# Patient Record
Sex: FEMALE | Race: WHITE | NOT HISPANIC OR LATINO | Employment: UNEMPLOYED | URBAN - METROPOLITAN AREA
[De-identification: names, ages, dates, MRNs, and addresses within clinical notes are randomized per-mention and may not be internally consistent; named-entity substitution may affect disease eponyms.]

---

## 2017-01-06 ENCOUNTER — GENERIC CONVERSION - ENCOUNTER (OUTPATIENT)
Dept: OTHER | Facility: OTHER | Age: 57
End: 2017-01-06

## 2017-01-06 ENCOUNTER — ALLSCRIPTS OFFICE VISIT (OUTPATIENT)
Dept: OTHER | Facility: OTHER | Age: 57
End: 2017-01-06

## 2017-01-11 ENCOUNTER — ALLSCRIPTS OFFICE VISIT (OUTPATIENT)
Dept: OTHER | Facility: OTHER | Age: 57
End: 2017-01-11

## 2017-01-11 ENCOUNTER — TRANSCRIBE ORDERS (OUTPATIENT)
Dept: ADMINISTRATIVE | Facility: HOSPITAL | Age: 57
End: 2017-01-11

## 2017-01-11 DIAGNOSIS — R94.31 ABNORMAL ELECTROCARDIOGRAM: ICD-10-CM

## 2017-01-11 DIAGNOSIS — R94.31 NONSPECIFIC ABNORMAL ELECTROCARDIOGRAM (ECG) (EKG): Primary | ICD-10-CM

## 2017-01-30 ENCOUNTER — HOSPITAL ENCOUNTER (OUTPATIENT)
Dept: NON INVASIVE DIAGNOSTICS | Facility: CLINIC | Age: 57
Discharge: HOME | End: 2017-01-30
Payer: COMMERCIAL

## 2017-01-30 DIAGNOSIS — R94.31 NONSPECIFIC ABNORMAL ELECTROCARDIOGRAM (ECG) (EKG): ICD-10-CM

## 2017-01-30 LAB
CHEST PAIN STATEMENT: NORMAL
MAX DIASTOLIC BP: 66 MMHG
MAX HEART RATE: 96 BPM
MAX PREDICTED HEART RATE: 163 BPM
MAX. SYSTOLIC BP: 114 MMHG
PROTOCOL NAME: NORMAL
REASON FOR TERMINATION: NORMAL
TARGET HR FORMULA: NORMAL
TEST INDICATION: NORMAL
TIME IN EXERCISE PHASE: 180 S

## 2017-01-30 PROCEDURE — 93017 CV STRESS TEST TRACING ONLY: CPT

## 2017-01-30 PROCEDURE — A9502 TC99M TETROFOSMIN: HCPCS

## 2017-01-30 PROCEDURE — 78452 HT MUSCLE IMAGE SPECT MULT: CPT

## 2017-01-30 RX ADMIN — REGADENOSON 0.4 MG: 0.08 INJECTION, SOLUTION INTRAVENOUS at 10:12

## 2017-02-02 ENCOUNTER — ALLSCRIPTS OFFICE VISIT (OUTPATIENT)
Dept: OTHER | Facility: OTHER | Age: 57
End: 2017-02-02

## 2017-04-13 ENCOUNTER — ALLSCRIPTS OFFICE VISIT (OUTPATIENT)
Dept: OTHER | Facility: OTHER | Age: 57
End: 2017-04-13

## 2017-05-01 ENCOUNTER — ALLSCRIPTS OFFICE VISIT (OUTPATIENT)
Dept: OTHER | Facility: OTHER | Age: 57
End: 2017-05-01

## 2017-06-12 ENCOUNTER — ALLSCRIPTS OFFICE VISIT (OUTPATIENT)
Dept: OTHER | Facility: OTHER | Age: 57
End: 2017-06-12

## 2017-06-12 DIAGNOSIS — I48.91 ATRIAL FIBRILLATION (HCC): ICD-10-CM

## 2017-06-12 DIAGNOSIS — I49.5 SICK SINUS SYNDROME (HCC): ICD-10-CM

## 2017-06-12 DIAGNOSIS — I10 ESSENTIAL (PRIMARY) HYPERTENSION: ICD-10-CM

## 2017-06-12 DIAGNOSIS — R00.1 BRADYCARDIA: ICD-10-CM

## 2017-06-12 DIAGNOSIS — R00.2 PALPITATIONS: ICD-10-CM

## 2017-06-12 DIAGNOSIS — E78.5 HYPERLIPIDEMIA: ICD-10-CM

## 2017-06-19 ENCOUNTER — TRANSCRIBE ORDERS (OUTPATIENT)
Dept: ADMINISTRATIVE | Facility: HOSPITAL | Age: 57
End: 2017-06-19

## 2017-06-19 DIAGNOSIS — R40.0 DAYTIME SLEEPINESS: ICD-10-CM

## 2017-06-19 DIAGNOSIS — R06.83 SNORING: Primary | ICD-10-CM

## 2017-06-19 DIAGNOSIS — I48.91 ATRIAL FIBRILLATION, UNSPECIFIED TYPE (HCC): ICD-10-CM

## 2017-07-13 ENCOUNTER — ALLSCRIPTS OFFICE VISIT (OUTPATIENT)
Dept: OTHER | Facility: OTHER | Age: 57
End: 2017-07-13

## 2017-07-13 ENCOUNTER — TRANSCRIBE ORDERS (OUTPATIENT)
Dept: SLEEP CENTER | Facility: CLINIC | Age: 57
End: 2017-07-13

## 2017-08-09 ENCOUNTER — ALLSCRIPTS OFFICE VISIT (OUTPATIENT)
Dept: OTHER | Facility: OTHER | Age: 57
End: 2017-08-09

## 2017-09-12 ENCOUNTER — TRANSCRIBE ORDERS (OUTPATIENT)
Dept: SLEEP CENTER | Facility: CLINIC | Age: 57
End: 2017-09-12

## 2017-09-12 DIAGNOSIS — G47.33 OSA (OBSTRUCTIVE SLEEP APNEA): Primary | ICD-10-CM

## 2017-09-13 ENCOUNTER — HOSPITAL ENCOUNTER (OUTPATIENT)
Dept: SLEEP CENTER | Facility: CLINIC | Age: 57
Discharge: HOME | End: 2017-09-13
Payer: COMMERCIAL

## 2017-09-13 DIAGNOSIS — G47.33 OSA (OBSTRUCTIVE SLEEP APNEA): ICD-10-CM

## 2017-09-13 PROCEDURE — 95810 POLYSOM 6/> YRS 4/> PARAM: CPT

## 2017-09-18 ENCOUNTER — TRANSCRIBE ORDERS (OUTPATIENT)
Dept: SLEEP CENTER | Facility: CLINIC | Age: 57
End: 2017-09-18

## 2017-09-18 DIAGNOSIS — G47.33 OSA (OBSTRUCTIVE SLEEP APNEA): Primary | ICD-10-CM

## 2017-10-25 ENCOUNTER — ALLSCRIPTS OFFICE VISIT (OUTPATIENT)
Dept: OTHER | Facility: OTHER | Age: 57
End: 2017-10-25

## 2018-01-10 NOTE — RESULT NOTES
Verified Results  HOLTER MONITOR - 24 HOUR 81Fkf2256 01:07PM Placido Gaston Order Number: [id number]     Test Name Result Flag Reference   HOLTER MONITOR - 24 HOUR (Report)     INDICATIONS: Palpitations     DESCRIPTION OF FINDINGS:   The patient was monitored for a total of 24 hours  The patient was predominantly noted to be in NSR throughout the study  The average heart rate was 49 beats per minute  The heart rate ranged from a low of 34 beats per minute in AM at 3:16 to a    maximum of 93 beats per minute in PM at 5:20  Ventricular ectopic activity consisted of 5 (0 0% of total beats)  There was no sustained or nonsustained ventricular tachycardia  Supraventricular ectopic activity consisted of 350 (0 5% of total beats)  There was one 4 beat run of supraventricular tachycardia identified  There was no evidence of atrial fibrillation or atrial flutter  There were no significant pauses  The longest R-R interval was 2 0 seconds  There was no evidence of advanced degree heart block  There were frequent episodes of sinus bradycardia  The accompanying patient diary notes symptoms of SOB and sharp pain which correlated with NSR  The patient also had flutter which correlated with NSR with a PVC  1  Predominantly NSR at a controlled rate with episodes of profound sinus bradycardia  2   Low density of PVC's  3   Low density of PAC's  4   Symptoms, as described, correlated with NSR with rare PVC's

## 2018-01-12 VITALS
HEIGHT: 65 IN | HEART RATE: 61 BPM | WEIGHT: 192 LBS | BODY MASS INDEX: 31.99 KG/M2 | SYSTOLIC BLOOD PRESSURE: 104 MMHG | DIASTOLIC BLOOD PRESSURE: 72 MMHG

## 2018-01-12 NOTE — PROGRESS NOTES
Chief Complaint  Patient is here for a EKG check per Dr Willis Bullard  Patient complaints of palpitations and dizziness  I reviewed the EKG with Dr Derek Byers  Active Problems    1  Atrial fibrillation (427 31) (I48 91)   2  Atypical chest pain (786 59) (R07 89)   3  Benign essential hypertension (401 1) (I10)   4  Chronic Lyme Disease (088 81)   5  Depression with anxiety (300 4) (F41 8)   6  Esophagitis, reflux (530 11) (K21 0)   7  Fatigue (780 79) (R53 83)   8  Glaucoma (365 9) (H40 9)   9  Hyperlipidemia (272 4) (E78 5)   10  Palpitations (785 1) (R00 2)   11  Sick sinus syndrome (427 81) (I49 5)   12  Sinus bradycardia (427 89) (R00 1)   13  Tachycardia-bradycardia syndrome (427 81) (I49 5)   14  Thoracic outlet syndrome (353 0) (G54 0)    Current Meds   1  Adult Aspirin EC Low Strength 81 MG Oral Tablet Delayed Release; TAKE 1 TABLET   DAILY; Therapy: (Recorded:17Oct2013) to Recorded   2  Atorvastatin Calcium 40 MG Oral Tablet; TAKE 1 TABLET AT BEDTIME; Therapy: 27Hgy7966 to (Evaluate:70Dwv7482)  Requested for: 27Apr2016; Last   Rx:22Apr2016 Ordered   3  Cosopt Ocumeter Plus 2-0 5 % SOLN;   Therapy: (Recorded:30Wfj5674) to Recorded   4  Dexilant 60 MG Oral Capsule Delayed Release; TAKE 1 CAPSULE DAILY EVERY   MORNING BEFORE BREAKFAST; Therapy: [phone]) to Recorded   5  Eliquis 5 MG Oral Tablet; Take 1 tablet twice daily  Requested for: 10Wdb6079; Last   Rx:36Ujy7990 Ordered   6  HydroCHLOROthiazide 25 MG Oral Tablet; TAKE 1 TABLET DAILY; Therapy: (Recorded:17Oct2013) to Recorded   7  Losartan Potassium 25 MG Oral Tablet; TAKE 1 TAB QHS; Therapy: 07LAB6247 to (Tika Oden)  Requested for: 39NIH0735; Last   Rx:34Vuv8880 Ordered   8  Lumigan 0 01 % Ophthalmic Solution; INSTILL 1 DROP INTO BOTH EYES ONCE DAILY   IN THE EVENING; Therapy: (Recorded:17Oct2013) to Recorded   9  RaNITidine HCl - 150 MG Oral Tablet; TAKE 1 TABLET DAILY; Therapy: (Recorded:19Apr2016) to Recorded   10  Restasis 0 05 % Ophthalmic Emulsion; INSTILL 1 DROP IN [facility] EYE TWICE DAILY; Therapy: (Recorded:46Bho3780) to Recorded   11  Sotalol HCl - 80 MG Oral Tablet; TAKE 1 TABLET TWICE DAILY; Therapy: (Kory Karimi) to Recorded   12  TraZODone HCl - 150 MG Oral Tablet; TAKE 1 TABLET AT BEDTIME; Therapy: (Recorded:16Cwg5506) to Recorded   13  Xanax 0 5 MG Oral Tablet; TAKE 1 TABLET 3 TIMES DAILY AS NEEDED; Therapy: (Recorded:63Utt0343) to Recorded   14  Zoloft 100 MG Oral Tablet; TAKE 1 TABLET DAILY; Therapy: (Recorded:20Zlx6088) to Recorded    Allergies    1  Lomotil TABS   2  Percocet TABS    Vitals  Signs    Heart Rate: 61  Systolic: 311  Diastolic: 72  Height: 5 ft 5 in  Weight: 192 lb   BMI Calculated: 31 95  BSA Calculated: 1 94    Plan  Palpitations    · EKG/ECG- POC; Status:Complete;   Done: 86OPC0321    Discussion/Summary    EKG reviewed  T wave inversion in lateral leads    this is new     Repeat another EKG in a week   follow up with Dr Pamela Lacey in a week if EKG changes are persisting or new symptoms of chest discomfort  Future Appointments    Date/Time Provider Specialty Site   01/11/2017 02:00 PM Cardiology, [address]   02/01/2017 11:40 AM TARA Zhang  Cardiology [facility]   01/19/2017 11:20 AM TARA Oliveira   Cardiology [facility]     Signatures   Electronically signed by : Asa Closs, ; Jan 6 2017 11:47AM EST                       (Author)    Electronically signed by : TARA Garcia ; Jan 8 2017  9:21AM EST                       (Author)

## 2018-01-13 VITALS
SYSTOLIC BLOOD PRESSURE: 140 MMHG | DIASTOLIC BLOOD PRESSURE: 88 MMHG | BODY MASS INDEX: 33.32 KG/M2 | HEIGHT: 65 IN | HEART RATE: 67 BPM | WEIGHT: 200 LBS

## 2018-01-13 VITALS
WEIGHT: 199.25 LBS | DIASTOLIC BLOOD PRESSURE: 84 MMHG | HEART RATE: 72 BPM | BODY MASS INDEX: 33.2 KG/M2 | SYSTOLIC BLOOD PRESSURE: 116 MMHG | HEIGHT: 65 IN

## 2018-01-13 NOTE — PROGRESS NOTES
Chief Complaint  Samantha Wall said she was at the hospital visiting her sister (inpatient) when she started to feel lightheaded  She felt like her heart was beating in her throat and she was SOB  She drove to the cardiology office and asked for an appt  Jennifer Reid approved nurse visit/EKG  Ordered by Dr Jose Hussein, reviewed by dr Vincent Eid said the EKG was okay  No changes to medication  Samantha Wall made a f/u appt with dr Jose Hussein  Active Problems    1  Abnormal EKG (794 31) (R94 31)   2  Atrial fibrillation (427 31) (I48 91)   3  Atypical chest pain (786 59) (R07 89)   4  Benign essential hypertension (401 1) (I10)   5  Chronic Lyme Disease (088 81)   6  Depression with anxiety (300 4) (F41 8)   7  Esophagitis, reflux (530 11) (K21 0)   8  Fatigue (780 79) (R53 83)   9  Glaucoma (365 9) (H40 9)   10  Hyperlipidemia (272 4) (E78 5)   11  Palpitations (785 1) (R00 2)   12  Sick sinus syndrome (427 81) (I49 5)   13  Sinus bradycardia (427 89) (R00 1)   14  T wave inversion in EKG (794 31) (R94 31)   15  Tachycardia-bradycardia syndrome (427 81) (I49 5)   16  Thoracic outlet syndrome (353 0) (G54 0)    Current Meds   1  Adult Aspirin EC Low Strength 81 MG Oral Tablet Delayed Release; TAKE 1 TABLET   DAILY; Therapy: (Recorded:17Oct2013) to Recorded   2  Alphagan P 0 1 % Ophthalmic Solution; INSTILL 1 DROP IN BOTH EYES EVERY 12   HOURS DAILY; Therapy: 48PFC4420 to Recorded   3  Atorvastatin Calcium 40 MG Oral Tablet; TAKE 1 TABLET AT BEDTIME; Therapy: 22Apr2016 to (Evaluate:08Apr2018)  Requested for: 14Apr2017; Last   Rx:13Apr2017 Ordered   4  Cosopt Ocumeter Plus 2-0 5 % SOLN;   Therapy: (Recorded:78Kce2690) to Recorded   5  Dexilant 60 MG Oral Capsule Delayed Release; TAKE 1 CAPSULE DAILY EVERY   MORNING BEFORE BREAKFAST; Therapy: [phone]) to Recorded   6  Eliquis 5 MG Oral Tablet; Take 1 tablet twice daily  Requested for: 40Mua6971; Last   Rx:68Oxz6365 Ordered   7   HydroCHLOROthiazide 25 MG Oral Tablet; TAKE 1 TABLET DAILY; Therapy: (Recorded:45Eft9421) to Recorded   8  Losartan Potassium 25 MG Oral Tablet; TAKE 1 TAB QHS; Therapy: 86ZIG1025 to (Albertina Frazier)  Requested for: 39SCD9922; Last   Rx:77Wqx0731 Ordered   9  Lumigan 0 01 % Ophthalmic Solution; INSTILL 1 DROP INTO BOTH EYES ONCE DAILY   IN THE EVENING; Therapy: (Recorded:32Rez4617) to Recorded   10  RaNITidine HCl - 150 MG Oral Tablet; TAKE 1 TABLET DAILY; Therapy: (Recorded:47Azo5095) to Recorded   11  Restasis 0 05 % Ophthalmic Emulsion; INSTILL 1 DROP IN [facility] EYE TWICE DAILY; Therapy: (Recorded:70Taa8549) to Recorded   12  Sotalol HCl - 80 MG Oral Tablet; TAKE 1 TABLET TWICE DAILY  Requested for:    39OLY0723; Last Rx:07Mar2017 Ordered   13  TraZODone HCl - 150 MG Oral Tablet; TAKE 1 TABLET AT BEDTIME; Therapy: (Recorded:03Ghj8124) to Recorded   14  Xanax 0 5 MG Oral Tablet; TAKE 1 TABLET 3 TIMES DAILY AS NEEDED; Therapy: (Recorded:99Htw8699) to Recorded   15  Zoloft 100 MG Oral Tablet; TAKE 1 TABLET DAILY; Therapy: (Recorded:76Pdq6233) to Recorded    Allergies    1  Lomotil TABS   2  Percocet TABS    Vitals  Signs    Heart Rate: 67, Apical  Systolic: 000, RUE  Diastolic: 88, RUE  Height: 5 ft 5 in  Weight: 200 lb   BMI Calculated: 33 28  BSA Calculated: 1 98    Plan  Atrial fibrillation, Palpitations    · EKG/ECG- POC; Status:Complete;   Done: 66IEU8388    Future Appointments    Date/Time Provider Specialty Site   04/13/2018 10:30 AM Cardiology, [address]   07/13/2017 08:00 AM Cardiology, Device Remote  [address] [place] [address] Av   06/12/2017 11:20 AM TARA Marks  Cardiology [facility]     Signatures   Electronically signed by : Bo León, ; May  2 2017  9:31AM EST                       (Author)    Electronically signed by :  Meryle Peacemaker, M D ; May  2 2017  6:46PM EST                       (Author)

## 2018-01-14 VITALS
HEIGHT: 65 IN | BODY MASS INDEX: 32.72 KG/M2 | SYSTOLIC BLOOD PRESSURE: 128 MMHG | DIASTOLIC BLOOD PRESSURE: 86 MMHG | HEART RATE: 83 BPM | WEIGHT: 196.38 LBS

## 2018-01-14 VITALS
WEIGHT: 196 LBS | BODY MASS INDEX: 32.65 KG/M2 | DIASTOLIC BLOOD PRESSURE: 90 MMHG | HEART RATE: 61 BPM | SYSTOLIC BLOOD PRESSURE: 132 MMHG | HEIGHT: 65 IN

## 2018-01-14 VITALS
DIASTOLIC BLOOD PRESSURE: 74 MMHG | HEIGHT: 65 IN | SYSTOLIC BLOOD PRESSURE: 110 MMHG | HEART RATE: 60 BPM | BODY MASS INDEX: 31.99 KG/M2 | WEIGHT: 192 LBS

## 2018-01-14 NOTE — PROGRESS NOTES
Chief Complaint  Patient here for EKG per Dr Jennyfer Collins to review afib and new T wave inversion on EKG 12/20/16  Patient HR 60 and regular  /72  Does c/o neck discomfort and headaches since PM implanted, may be related to change in sleep position  No cardiac symptoms today  Dr Jennyfer Collins reviewed EKG and recommended pharmacologic nuclear stress test (patient states cannot walk on a treadmill) to r/u CAD  Scheduled for 1/16/17  Has f/u appt with Dr Jennyfer Collins 1/19/17  Active Problems    1  Abnormal EKG (794 31) (R94 31)   2  Atrial fibrillation (427 31) (I48 91)   3  Atypical chest pain (786 59) (R07 89)   4  Benign essential hypertension (401 1) (I10)   5  Chronic Lyme Disease (088 81)   6  Depression with anxiety (300 4) (F41 8)   7  Esophagitis, reflux (530 11) (K21 0)   8  Fatigue (780 79) (R53 83)   9  Glaucoma (365 9) (H40 9)   10  Hyperlipidemia (272 4) (E78 5)   11  Palpitations (785 1) (R00 2)   12  Sick sinus syndrome (427 81) (I49 5)   13  Sinus bradycardia (427 89) (R00 1)   14  T wave inversion in EKG (794 31) (R94 31)   15  Tachycardia-bradycardia syndrome (427 81) (I49 5)   16  Thoracic outlet syndrome (353 0) (G54 0)    Current Meds   1  Adult Aspirin EC Low Strength 81 MG Oral Tablet Delayed Release; TAKE 1 TABLET   DAILY; Therapy: (Recorded:17Oct2013) to Recorded   2  Atorvastatin Calcium 40 MG Oral Tablet; TAKE 1 TABLET AT BEDTIME; Therapy: 13Bru8189 to (Evaluate:10Gsk8255)  Requested for: 27Apr2016; Last   Rx:22Apr2016 Ordered   3  Cosopt Ocumeter Plus 2-0 5 % SOLN;   Therapy: (Recorded:03Nov2016) to Recorded   4  Dexilant 60 MG Oral Capsule Delayed Release; TAKE 1 CAPSULE DAILY EVERY   MORNING BEFORE BREAKFAST; Therapy: (29-29-69-33) to Recorded   5  Eliquis 5 MG Oral Tablet; Take 1 tablet twice daily  Requested for: 38Luf0414; Last   Rx:94Osm9447 Ordered   6  HydroCHLOROthiazide 25 MG Oral Tablet; TAKE 1 TABLET DAILY; Therapy: (Recorded:17Oct2013) to Recorded   7   Losartan Potassium 25 MG Oral Tablet; TAKE 1 TAB QHS; Therapy: 87RUD5236 to (Abhay Hines)  Requested for: 59DDI7411; Last   Rx:73Dfv0139 Ordered   8  Lumigan 0 01 % Ophthalmic Solution; INSTILL 1 DROP INTO BOTH EYES ONCE DAILY   IN THE EVENING; Therapy: (Recorded:70Jsj0975) to Recorded   9  RaNITidine HCl - 150 MG Oral Tablet; TAKE 1 TABLET DAILY; Therapy: (Recorded:45Qfq9919) to Recorded   10  Restasis 0 05 % Ophthalmic Emulsion; INSTILL 1 DROP IN [facility] EYE TWICE DAILY; Therapy: (Recorded:56Tnr3413) to Recorded   11  Sotalol HCl - 80 MG Oral Tablet; TAKE 1 TABLET TWICE DAILY; Therapy: (77 [phone]) to Recorded   12  TraZODone HCl - 150 MG Oral Tablet; TAKE 1 TABLET AT BEDTIME; Therapy: (Recorded:98Ygl9091) to Recorded   13  Xanax 0 5 MG Oral Tablet; TAKE 1 TABLET 3 TIMES DAILY AS NEEDED; Therapy: (Recorded:74Vjs6220) to Recorded   14  Zoloft 100 MG Oral Tablet; TAKE 1 TABLET DAILY; Therapy: (Recorded:28Mmk8607) to Recorded    Allergies    1  Lomotil TABS   2  Percocet TABS    Vitals  Signs    Heart Rate: 60  Systolic: 752, LUE, Sitting  Diastolic: 74, LUE, Sitting  BP Cuff Size: Large  Height: 5 ft 5 in  Weight: 192 lb   BMI Calculated: 31 95  BSA Calculated: 1 94    Plan   Atrial fibrillation, T wave inversion in EKG    · EKG/ECG- POC; Status:Complete;   Done: 10VZF2104    NM MYOCARDIAL PERFUSION SPECT (RX STRESS AND/OR REST); Status:Need Information - Financial Authorization,Retrospective By Protocol Authorization; Requested ISC:[id number];   Perform:[facility]; XIS:[id number]; Last Updated By:Vinny Knapp; 1/14/2017 7:48:42 PM;Ordered; For:Abnormal EKG, T wave inversion in EKG; Ordered By:Norman San; Discussion/Summary    Wait till 4 weeks after device for nuclear study  then follow up with me        Future Appointments    Date/Time Provider Specialty Site   04/13/2017 10:30 AM Cardiology, [address] [facility]   07/13/2017 08:00 AM Cardiology, Device Remote   [address]   02/01/2017 11:40 AM ATRA Doll  Cardiology [facility] CARDIOLOGY [place]   02/02/2017 09:00 AM TARA Mccollum   Cardiology  CARDIOLOGY  [facility]     Signatures   Electronically signed by : TARA Cardona ; Mathew 15 2017  4:38PM EST                       (Author)

## 2018-01-16 NOTE — PROCEDURES
Procedures by Xiang Pearson MD at 12/28/2016   3:17 PM      Author:  Xiang Pearson MD Service:  Electrophysiology Author Type:  Physician     Filed:  12/28/2016  4:59 PM Date of Service:  12/28/2016  3:17 PM Status:  Signed     :  Xiang Pearson MD (Physician)            PP DUAL  CHAMBER    History and physical were reviewed  Patient was examined and history was reviewed  No change in patient's condition Since history and physical has been completed        The pre- operative diagnosis:  Sick sinus syndrome  Tachy jesus syndrome  PAF        Postoperative diagnosis:  Sick sinus syndrome  Tachy jesus syndrome  PAF      Procedure:  Dual chamber PPM        Surgeon: [address] [place] [address] Drive -none    Specimens - none    Estimated blood loss- 20 ml    Findings-none    Complications none    Anesthesia-  Anesthesia by anaesthesiologist , local lidocaine by myself      Details of the device            Description of procedure: The patient was seen before the procedure  The details of the procedure was explained and patient agreed to the same  Appropriate consent was signed  The patient was brought to the electrophysiologic laboratory  Proper time out was done  Sterile dressing and draping was done  Local lidocaine was infiltrated, In the infraclavicular region at the site of device implant  Initial incision was made by a sharp number 10 blade  Thereafter electrocautery and blunt dissection was used to form a prepectoral pocket  Appropriate hemostasis was taken care of  There was an area on the floor, medial end, where significant bleeding  happened and required electro-cautery for control  20 mL of radiocontrast, followed by 20 mL of saline, was injected in a peripheral vein on the side of the implant  Under direct visualization, the axillary vein was  Punctured,extra-thoracic  The patient has very small caliber veins  Initial access by  micro-puncture needle and 0 014 wire   Then upgraded to standard wire A single guidewire was inserted, and taking all the way to the inferior vena cava to confirm that it is on the right side  We also confirmed by the left anterior oblique view that the wire is in the right side  Thereafter a second access was obtained  using the fluoroscopic image of the venogram as a roadmap  Wire was once again taken to the inferior vena cava, and position checked in Swedish views To confirm the appropriate position  A sheath was passed over the wire  The right ventricular lead was taken through the sheath  In BRICENO view the lead was taken to the right ventricular outflow tract  It was then dropped to the apex  Position of the lead was confirmed in both BRICENO and Swedish  views that it was apical and septal  Appropriate sensing and thresholds were noted  The lead was screwed in  Once again the lead was tested for appropriate sensing, impedance and threshold  The sheath was removed  The lead was sutured to the prepectoral  fascia and muscle  A 7 Welsh sheath was passed over the second wire  The dilator and wire were removed  An atrial lead with the car stylet in it was taken through this sheath into the right atrium  It was maneuvered into the right atrial appendage  Appropriate sensing  and thresholds were noted  The lead was screwed in  The sheath was removed  The lead was sutured to the pectoral muscle and fascia    The pocket was washed with large amounts of antibiotic saline  Appropriate hemostasis was taken care of  The lead was connected to the generator  The generator and leads were placed inside the pocket  Generator was tied down to the pocket  Thereafter  the device was interrogated and proper sensing and thresholds through the device were confirmed  The pocket was closed in 3 separate layers with intermittent sutures   Dressing was done with Steri-Strips, Telfa and Tegaderm    Because of oozing from a site on floor of pocket,  which required extensive electro-cautery , a modified pressure dressing was applied         Summary of the procedure: The patient came in to the laboratory for dual -chamber device placement  The device has been placed and patient tolerated the procedure well                           Christy MADERA    Dec 28 2016  5:00PM [facility] Standard Time

## 2018-01-17 NOTE — RESULT NOTES
Verified Results  HOLTER MONITOR - 24 HOUR [id number] 09:19AM Loyd Metzger Order Number: [id number]    - Patient Instructions: To schedule this appointment, please contact Central Scheduling at [phone]  For Lakeisha Grande, please call [phone]   Order Number: [id number]    - Patient Instructions: To schedule this appointment, please contact Central Scheduling at [phone]  For Lakeisha Grande, please call [phone]  Test Name Result Flag Reference   HOLTER MONITOR - 24 HOUR (Report)     INDICATIONS: Bradycardia     DESCRIPTION OF FINDINGS:   The patient was monitored for a total of 24 hours  The patient was predominantly noted to be in NSR throughout the study  The average heart rate was 48 beats per minute  The heart rate ranged from a low of 33 beats per minute in AM at 4:52 to a    maximum of 90 beats per minute in PM at 8:00  Ventricular ectopic activity consisted of 0 (0 0% of total beats)  There was no sustained or nonsustained ventricular tachycardia  Supraventricular ectopic activity consisted of 767 (1 1% of total beats)  There was no supraventricular tachycardia identified  There was no evidence of atrial fibrillation or atrial flutter  There were no significant pauses  There were frequent episodes of sinus bradycardia (SB)  The longest R-R interval was 2 2 seconds  There was no evidence of advanced degree heart block  The accompanying patient diary notes symptoms of pain down arm and flutter  Correlation with the tracings at these times reveals sinus bradycardia  1  Predominantly NSR with a low average HR and frequent episodes of SB noted, most notably nocturnally  2  No PVC's  3   Moderate density of PAC's          4  Symptoms as described correlated with SB

## 2018-02-02 ENCOUNTER — OFFICE VISIT (OUTPATIENT)
Dept: CARDIOLOGY CLINIC | Facility: CLINIC | Age: 58
End: 2018-02-02
Payer: COMMERCIAL

## 2018-02-02 VITALS
WEIGHT: 203 LBS | DIASTOLIC BLOOD PRESSURE: 84 MMHG | BODY MASS INDEX: 34.66 KG/M2 | HEART RATE: 65 BPM | HEIGHT: 64 IN | SYSTOLIC BLOOD PRESSURE: 122 MMHG

## 2018-02-02 DIAGNOSIS — I48.0 PAROXYSMAL ATRIAL FIBRILLATION (HCC): ICD-10-CM

## 2018-02-02 DIAGNOSIS — I10 HYPERTENSION, UNSPECIFIED TYPE: ICD-10-CM

## 2018-02-02 DIAGNOSIS — Z95.0 HISTORY OF PERMANENT CARDIAC PACEMAKER PLACEMENT: ICD-10-CM

## 2018-02-02 DIAGNOSIS — I48.91 ATRIAL FIBRILLATION, UNSPECIFIED TYPE (HCC): Primary | ICD-10-CM

## 2018-02-02 DIAGNOSIS — E78.5 DYSLIPIDEMIA: ICD-10-CM

## 2018-02-02 PROCEDURE — 99214 OFFICE O/P EST MOD 30 MIN: CPT | Performed by: INTERNAL MEDICINE

## 2018-02-02 PROCEDURE — 93000 ELECTROCARDIOGRAM COMPLETE: CPT | Performed by: INTERNAL MEDICINE

## 2018-02-02 RX ORDER — TRAMADOL HYDROCHLORIDE 100 MG/1
100 TABLET, EXTENDED RELEASE ORAL DAILY
COMMUNITY
Start: 2018-01-30 | End: 2021-11-23 | Stop reason: ALTCHOICE

## 2018-02-02 RX ORDER — SOTALOL HYDROCHLORIDE 80 MG/1
1 TABLET ORAL 2 TIMES DAILY
COMMUNITY
End: 2018-02-22 | Stop reason: SDUPTHER

## 2018-02-02 RX ORDER — HYDROCODONE BITARTRATE AND ACETAMINOPHEN 5; 325 MG/1; MG/1
1 TABLET ORAL 2 TIMES DAILY PRN
Refills: 0 | COMMUNITY
Start: 2018-01-26 | End: 2021-11-23 | Stop reason: ALTCHOICE

## 2018-02-02 RX ORDER — TIZANIDINE 2 MG/1
1 TABLET ORAL 3 TIMES DAILY
COMMUNITY
End: 2019-09-10 | Stop reason: SDUPTHER

## 2018-02-02 RX ORDER — GABAPENTIN 400 MG/1
CAPSULE ORAL
COMMUNITY
Start: 2017-11-16 | End: 2018-11-27 | Stop reason: ALTCHOICE

## 2018-02-07 ENCOUNTER — CLINICAL SUPPORT (OUTPATIENT)
Dept: CARDIOLOGY CLINIC | Facility: CLINIC | Age: 58
End: 2018-02-07
Payer: COMMERCIAL

## 2018-02-07 DIAGNOSIS — I49.5 SINOATRIAL NODE DYSFUNCTION (HCC): Primary | ICD-10-CM

## 2018-02-07 DIAGNOSIS — Z95.0 CARDIAC PACEMAKER IN SITU: ICD-10-CM

## 2018-02-07 PROCEDURE — 93296 REM INTERROG EVL PM/IDS: CPT | Performed by: INTERNAL MEDICINE

## 2018-02-07 PROCEDURE — 93294 REM INTERROG EVL PM/LDLS PM: CPT | Performed by: INTERNAL MEDICINE

## 2018-02-07 NOTE — PROGRESS NOTES
CARELINK TRANSMISSION: BATTERY STATUS "OK"  AP 98 5%  0%  ALL AVAILABLE LEAD PARAMETERS WITHIN NORMAL LIMITS  1 VHR NOTED, 1 SEC LONG  AVAIL EGRAM PRESENTS AS SVT  NORMAL DEVICE FUNCTION   NC       Current Outpatient Prescriptions:     ALPRAZolam (XANAX) 0 5 mg tablet, Take 0 5 mg by mouth 3 (three) times a day as needed for anxiety, Disp: , Rfl:     apixaban (ELIQUIS) 5 mg, Take 1 tablet by mouth 2 (two) times a day, Disp: , Rfl:     atorvastatin (LIPITOR) 40 mg tablet, Take 40 mg by mouth daily, Disp: , Rfl:     bimatoprost (LUMIGAN) 0 01 % ophthalmic drops, Apply 1 drop to eye Daily, Disp: , Rfl:     cycloSPORINE (RESTASIS) 0 05 % ophthalmic emulsion, Administer 1 drop to both eyes every 12 (twelve) hours, Disp: , Rfl:     dexlansoprazole (DEXILANT) 60 MG capsule, Take 60 mg by mouth daily, Disp: , Rfl:     dorzolamide-timolol (COSOPT) 2-0 5 % ophthalmic solution, Administer 1 drop to both eyes 2 (two) times a day, Disp: , Rfl:     gabapentin (NEURONTIN) 400 mg capsule, , Disp: , Rfl:     hydrochlorothiazide (HYDRODIURIL) 25 mg tablet, Take 25 mg by mouth daily, Disp: , Rfl:     HYDROcodone-acetaminophen (NORCO) 5-325 mg per tablet, Take 1 tablet by mouth 2 (two) times a day as needed for pain, Disp: , Rfl: 0    LIDOPRO 4-4-5 % PTCH, APPLY 1 PATCH TOPICALLY TO THE AFFECTED AREA EVERY 8 TO 12 HOURS AS NEEDED MAX 2 PATCHES PER DAY, Disp: , Rfl: 6    olopatadine HCl (PATADAY) 0 2 % opth drops, Administer 1 drop to both eyes 2 (two) times a day, Disp: , Rfl:     polyethylene glycol-propylene glycol (SYSTANE) 0 4-0 3 %, every 3 (three) hours as needed, Disp: , Rfl:     ranitidine (ZANTAC) 150 mg tablet, Take 150 mg by mouth daily at bedtime, Disp: , Rfl:     sertraline (ZOLOFT) 100 mg tablet, Take 100 mg by mouth daily, Disp: , Rfl:     sotalol (BETAPACE) 80 mg tablet, Take 1 tablet by mouth 2 (two) times a day, Disp: , Rfl:     tiZANidine (ZANAFLEX) 2 mg tablet, Take 1 tablet by mouth 3 (three) times a day, Disp: , Rfl:     traMADol (ULTRAM-ER) 100 mg 24 hr tablet, , Disp: , Rfl:     traZODone (DESYREL) 150 mg tablet, Take 150 mg by mouth daily at bedtime, Disp: , Rfl:     VENTOLIN  (90 Base) MCG/ACT inhaler, , Disp: , Rfl:     [address]  [facility], [address] [facility]  [phone]     Transthoracic Echocardiogram  2D, M-mode, Doppler, and Color Doppler     Study date:  29-Nov-2016  LEFT VENTRICLE:  Size was normal   Systolic function was normal by EF (single plane method of disks)  Ejection  fraction was estimated to be 60 %

## 2018-02-22 DIAGNOSIS — I48.0 PAROXYSMAL ATRIAL FIBRILLATION (HCC): Primary | ICD-10-CM

## 2018-02-22 RX ORDER — SOTALOL HYDROCHLORIDE 80 MG/1
TABLET ORAL
Qty: 180 TABLET | Refills: 3 | Status: SHIPPED | OUTPATIENT
Start: 2018-02-22 | End: 2019-01-14 | Stop reason: SDUPTHER

## 2018-03-07 NOTE — PROGRESS NOTES
"  Discussion/Summary  Normal device function     Brief PAT  Results/Data  Cardiac Device Remote [id number] 01:07PM Urvashi Peraza     Test Name Result Flag Reference   MISCELLANEOUS COMMENT      CARELINK TRANSMISSION: BATTERY VOLTAGE ADEQUATE (9 5 YRS)  AP-99%, <0 1%  ALL AVAILABLE LEAD PARAMETERS WITHIN NORMAL LIMITS  1 VHR EPISODE- PAT ON EGM  PT ON ELIQUIS, ASA 81MG & SOTALOL  NORMAL DEVICE FUNCTION  GV   Cardiac Electrophysiology Report      [id number]  pdf   DEVICE TYPE Pacemaker       Cardiac Electrophysiology Report [id number] 01:07PM Urvashi Peraza     Test Name Result Flag Reference   Cardiac Electrophysiology Report      [id number]  pdf     Signatures   Electronically signed by : Liz Torrez RN; Jul 17 2017  3:40PM EST                       (Author)    Electronically signed by : TARA Garcia ; Jul 17 2017 10:58PM EST                       (Author)    "

## 2018-03-07 NOTE — PROGRESS NOTES
"  Discussion/Summary  Normal device function      Results/Data  Cardiac Device Remote 25Oct2017 01:46PM Avis Ege     Test Name Result Flag Reference   MISCELLANEOUS COMMENT (Report)     CARELINK TRANSMISSION:V2LLEPFQQW VOLTAGE ADEQUATE (9 YRS)  AP-98 5%, -0 04%  ALL AVAILABLE LEAD PARAMETERS APPEAR WITHIN NORMAL LIMITS & STABLE  1 VHR EPISODE WITH EGRM SHOWING PAT @ 167 BPM, 14 BEATS  HX OF SAME  PT TAKES ASA 81, ELIQUIS, SOTALOL  PACEMAKER FUNCTIONING APPROPRIATELY  eb   Cardiac Electrophysiology Report      [id number]  pdf   DEVICE TYPE Pacemaker       Cardiac Electrophysiology Report [id number] 01:46PM Avis Ege     Test Name Result Flag Reference   Cardiac Electrophysiology Report      [id number]     Signatures   Electronically signed by : Elsa Champion, ; Oct 26 2017 12:52PM EST                       (Author)    Electronically signed by : Arlyn Muñiz DO; Oct 29 2017  7:10PM EST                       (Author)    "

## 2018-03-07 NOTE — PROGRESS NOTES
"  Discussion/Summary  Normal device function     Atrial pacing    PAF - history of same  on sotalol  on eliquis  Results/Data  Cardiac Device In Clinic [id number] 03:18PM Sari Leyden     Test Name Result Flag Reference   MISCELLANEOUS COMMENT (Report)     DEVICE INTERROGATED IN THE [facility] [employer] OFFICE  BATTERY VOLTAGE ADEQUATE (9 5 YRS)  AP-99%, <0 1%  ALL LEAD PARAMETERS WITHIN NORMAL LIMITS  ALL OTHER TESTING WITHIN NORMAL LIMITS  DECREASE MADE TO RA & RV AMPLITUDE TO PROMOTE DEVICE LONGEVITY WHILE MAINTAINING AN APPROPRIATE SAFETY MARGIN  2 VHR EPISODES- 6 BEATS PAT ON 4/6/17 & 6 BEATS NSVT ON 2/19/17, AVG CL~380MS  EF-60% (ECHO 11/29/16)  PT ON ASA 81MG, ELIQUIS & SOTALOL  NORMAL DEVICE FUNCTION  GV   Cardiac Electrophysiology Report      [id number]  pdf   DEVICE TYPE Pacemaker       Cardiac Electrophysiology Report [id number] 03:18PM Sari Leyden     Test Name Result Flag Reference   Cardiac Electrophysiology Report      [id number]  pdf     Signatures   Electronically signed by : Joey Horton RN; Apr 13 2017 12:43PM EST                       (Author)    Electronically signed by : TARA Gonzalez ; Apr 16 2017 12:34PM EST                       (Author)    "

## 2018-03-07 NOTE — PROGRESS NOTES
"  Discussion/Summary  Normal device function      Results/Data  Cardiac Device In Clinic [id number] 06:59PM [facility]     Test Name Result Flag Reference   MISCELLANEOUS COMMENT (Report)     DEVICE INTERROGATED IN THE [facility] OFFICE  BATTERY VOLTAGE ADEQUATE (11 YRS)  AP-99%, <0 1%  NO SIGNIFICANT HIGH RATE EPISODES  ALL LEAD PARAMETERS WITHIN NORMAL LIMITS  ALL OTHER TESTING WITHIN NORMAL LIMITS  NORMAL DEVICE FUNCTION  WOUND CHECK: INCISION CLEAN AND DRY WITH EDGES APPROXIMATE; WOUND CARE AND RESTRICTIONS REVIEWED WITH PATIENT  GV   Cardiac Electrophysiology Report      [id number] [id number]  pdf   DEVICE TYPE Pacemaker       Cardiac Electrophysiology Report [id number] 06:59PM [facility]     Test Name Result Flag Reference   Cardiac Electrophysiology Report      [id number]  pdf     Signatures   Electronically signed by : Lisa Garcia RN; Jan 11 2017  2:27PM EST                       (Author)    Electronically signed by : TARA Edmonds ; Jan 14 2017  9:15PM EST                       (Author)    "

## 2018-03-30 ENCOUNTER — TELEPHONE (OUTPATIENT)
Dept: CARDIOLOGY CLINIC | Facility: CLINIC | Age: 58
End: 2018-03-30

## 2018-03-30 NOTE — TELEPHONE ENCOUNTER
FYI: Ana reporting "feeling strange" felt a vibration feeling in her chest, then developed a sharp headache  W/c/o fatigue  BP's 101/56, 107/64, 61  Advised to report to ER or call the office back if symptoms become worse  Pt sending over a transmission now     S/W Device no episodes

## 2018-04-02 DIAGNOSIS — E78.5 DYSLIPIDEMIA: Primary | ICD-10-CM

## 2018-04-03 RX ORDER — ATORVASTATIN CALCIUM 40 MG/1
TABLET, FILM COATED ORAL
Qty: 90 TABLET | Refills: 3 | Status: SHIPPED | OUTPATIENT
Start: 2018-04-03 | End: 2019-03-28 | Stop reason: SDUPTHER

## 2018-04-13 ENCOUNTER — CLINICAL SUPPORT (OUTPATIENT)
Dept: CARDIOLOGY CLINIC | Facility: CLINIC | Age: 58
End: 2018-04-13
Payer: COMMERCIAL

## 2018-04-13 DIAGNOSIS — Z95.0 PRESENCE OF PERMANENT CARDIAC PACEMAKER: ICD-10-CM

## 2018-04-13 DIAGNOSIS — I49.5 SICK SINUS SYNDROME (HCC): Primary | ICD-10-CM

## 2018-04-13 PROCEDURE — 93294 REM INTERROG EVL PM/LDLS PM: CPT | Performed by: INTERNAL MEDICINE

## 2018-04-13 PROCEDURE — 93296 REM INTERROG EVL PM/IDS: CPT | Performed by: INTERNAL MEDICINE

## 2018-04-13 NOTE — PROGRESS NOTES
DEVICE INTERROGATED IN THE [facility] OFFICE  (PACEMAKER) BATTERY VOLTAGE ADEQUATE  (8 5 YRS)  AP 99%  <1%  ALL LEAD PARAMETERS WITHIN NORMAL LIMITS  NO SIGNIFICANT HIGH RATE EPISODES   NORMAL DEVICE FUNCTION --HERNÁNDEZ

## 2018-07-12 ENCOUNTER — REMOTE DEVICE CLINIC VISIT (OUTPATIENT)
Dept: CARDIOLOGY CLINIC | Facility: CLINIC | Age: 58
End: 2018-07-12
Payer: COMMERCIAL

## 2018-07-12 DIAGNOSIS — I49.5 SSS (SICK SINUS SYNDROME) (HCC): Primary | ICD-10-CM

## 2018-07-12 DIAGNOSIS — Z95.0 PRESENCE OF CARDIAC PACEMAKER: ICD-10-CM

## 2018-07-12 PROCEDURE — 93296 REM INTERROG EVL PM/IDS: CPT | Performed by: INTERNAL MEDICINE

## 2018-07-12 PROCEDURE — 93294 REM INTERROG EVL PM/LDLS PM: CPT | Performed by: INTERNAL MEDICINE

## 2018-07-12 NOTE — PROGRESS NOTES
Results for orders placed or performed in visit on 07/12/18   Cardiac EP device report    Narrative    MDT DUAL PM  CARELINK TRANSMISSION: BATTERY VOLTAGE ADEQUATE (8 5 YRS)  AP: 98 2%  : <0 1%  ALL AVAILABLE LEAD PARAMETERS WITHIN NORMAL LIMITS  NO SIGNIFICANT HIGH RATE EPISODES  PACEMAKER FUNCTIONING APPROPRIATELY   [address] [place] [address]

## 2018-07-25 ENCOUNTER — TELEPHONE (OUTPATIENT)
Dept: CARDIOLOGY CLINIC | Facility: CLINIC | Age: 58
End: 2018-07-25

## 2018-07-25 NOTE — TELEPHONE ENCOUNTER
Ana called, she was at pain management yesterday for injection  BP there 149/106  Received injection and went home  Has not checked her BP since appt  C/o HA yesterday  Has not slept in 2-3 nights  Some stress at home  I advised her to check her BP and c/b if still elevated-- verbally understood

## 2018-07-27 ENCOUNTER — TELEPHONE (OUTPATIENT)
Dept: CARDIOLOGY CLINIC | Facility: CLINIC | Age: 58
End: 2018-07-27

## 2018-07-27 NOTE — TELEPHONE ENCOUNTER
[facility] called questioning a drug interactio between the patient's Sotalol and the Trazadone and Tizanadine that she is on and they need the ok to fill it

## 2018-07-31 NOTE — TELEPHONE ENCOUNTER
Patient states she has been on both the Trazadone and the Tizanidine for as long as she can remember  She has been taking all three meds with no prior issues  She currently states she feels very depressed with a lot of headaches, and some SOB  She has had a recent injection in her back and has not felt herself lately   She has an upcoming with you in November and will see PCP on Thursday 8/2

## 2018-10-15 ENCOUNTER — TELEPHONE (OUTPATIENT)
Dept: CARDIOLOGY CLINIC | Facility: CLINIC | Age: 58
End: 2018-10-15

## 2018-10-15 NOTE — TELEPHONE ENCOUNTER
Received fax notification from [phone] pain Specialist to hold Eliquis  3 days prior, for Cervical Radiofrequency Thermal Denervation with Dr Reeves Dears on 10/24/18  Is it ok to hold?

## 2018-11-18 DIAGNOSIS — I48.0 PAROXYSMAL ATRIAL FIBRILLATION (HCC): Primary | ICD-10-CM

## 2018-11-19 RX ORDER — DIAZEPAM 5 MG/1
TABLET ORAL
Refills: 0 | COMMUNITY
Start: 2018-10-23 | End: 2018-11-27 | Stop reason: ALTCHOICE

## 2018-11-20 RX ORDER — APIXABAN 5 MG/1
TABLET, FILM COATED ORAL
Qty: 180 TABLET | Refills: 3 | Status: SHIPPED | OUTPATIENT
Start: 2018-11-20 | End: 2019-11-04 | Stop reason: SDUPTHER

## 2018-11-27 ENCOUNTER — OFFICE VISIT (OUTPATIENT)
Dept: CARDIOLOGY CLINIC | Facility: CLINIC | Age: 58
End: 2018-11-27
Payer: COMMERCIAL

## 2018-11-27 VITALS
BODY MASS INDEX: 35.37 KG/M2 | SYSTOLIC BLOOD PRESSURE: 158 MMHG | HEART RATE: 97 BPM | WEIGHT: 207.2 LBS | DIASTOLIC BLOOD PRESSURE: 94 MMHG | HEIGHT: 64 IN

## 2018-11-27 DIAGNOSIS — I48.0 PAROXYSMAL ATRIAL FIBRILLATION (HCC): Primary | ICD-10-CM

## 2018-11-27 DIAGNOSIS — Z95.0 HISTORY OF PERMANENT CARDIAC PACEMAKER PLACEMENT: ICD-10-CM

## 2018-11-27 DIAGNOSIS — E78.5 DYSLIPIDEMIA: ICD-10-CM

## 2018-11-27 DIAGNOSIS — I10 HYPERTENSION, UNSPECIFIED TYPE: ICD-10-CM

## 2018-11-27 DIAGNOSIS — R00.2 PALPITATIONS: ICD-10-CM

## 2018-11-27 PROCEDURE — 93000 ELECTROCARDIOGRAM COMPLETE: CPT | Performed by: INTERNAL MEDICINE

## 2018-11-27 PROCEDURE — 99214 OFFICE O/P EST MOD 30 MIN: CPT | Performed by: INTERNAL MEDICINE

## 2018-11-27 RX ORDER — LISINOPRIL AND HYDROCHLOROTHIAZIDE 25; 20 MG/1; MG/1
1 TABLET ORAL DAILY
Qty: 60 TABLET | Refills: 3 | Status: SHIPPED | OUTPATIENT
Start: 2018-11-27 | End: 2019-07-14 | Stop reason: SDUPTHER

## 2018-11-27 NOTE — PROGRESS NOTES
Cardiology Follow Up    Nava Amaya  1960  [id number]  [address] [facility]  [address] [place] [address] [address]    1  Paroxysmal atrial fibrillation (HCC)  POCT ECG       I had the pleasure of seeing Nava Amaya for a follow up visit  Interval History: There have been no significant interval events or hospitalizations since the patient's last visit  History of the presenting illness, Discussion/Summary and My Plan are as follows: She is 58 yrs and has a history of sinus bradycardia/sick sinus syndrome as well as paroxysmal atrial fibrillation  She also has a history of Lyme's disease-previously positive for IgG  She also has a history of gastroparesis  She underwent a Medtronic MRI compatible dual-chamber permanent pacemaker implantation in Dec 2016  She was also initiated on sotalol for her atrial fibrillation  She presents for follow-up visit and denies any cardiac symptoms  Blood pressure log at home periodically shows normal blood pressures for most part, rare borderline low BPs  ECG demonstrates atrial paced rhythm with normal intervals  Plan:    Sick sinus syndrome/sinus bradycardia: Currently normal sinus rhythm  Asymptomatic at this time  Status post Medtronic dual-chamber permanent pacemaker-MRI compatible    Atrial fibrillation and palpitations now: Paroxysmal, currently maintained in sinus rhythm/Atrial paced rhytm on sotalol 80 mg bid  Normal intervals on ECG  Maintained on Eliquis, for her frequent palpitations-especially when she does the dishes, will await next pacemaker interrogation  Has symptoms of sleep apnea, checked sleep study especially since the plan is to keep her in sinus rhythm - was told - has mild SENTHIL - not on CPAP    Status post Medtronic MRI compatible dual-chamber permanent pacemaker implantation: Normal function   No atrial fibrillation on last interrogation in July 2018  No changes to management at this time  She had a normal echocardiogram-November 2016 and a negative nuclear stress test-January 2017    History of Hypertension: Currently On Sotalol alone  More consistently elevated now, change hydrochlorothiazide to lisinopril 20/hydrochlorothiazide 25    Dyslipidemia:  February 2018: Total cholesterol 193, triglycerides 115, HDL 88, LDL 82    Follow up in 9 months    Patient Active Problem List   Diagnosis    Symptomatic bradycardia    Atrial fibrillation (HCC)    Paroxysmal atrial fibrillation (HCC)    History of permanent cardiac pacemaker placement    Dyslipidemia    Hypertension     No past medical history on file  Social History     Social History    Marital status: /Civil Union     Spouse name: N/A    Number of children: N/A    Years of education: N/A     Occupational History    Not on file  Social History Main Topics    Smoking status: Former Smoker     Quit date: 5/2/2009    Smokeless tobacco: Never Used    Alcohol use No    Drug use: No    Sexual activity: Not on file     Other Topics Concern    Not on file     Social History Narrative    No narrative on file      No family history on file  No past surgical history on file      Current Outpatient Prescriptions:     ALPRAZolam (XANAX) 0 5 mg tablet, Take 0 5 mg by mouth 3 (three) times a day as needed for anxiety, Disp: , Rfl:     atorvastatin (LIPITOR) 40 mg tablet, TAKE 1 TABLET AT BEDTIME, Disp: 90 tablet, Rfl: 3    bimatoprost (LUMIGAN) 0 01 % ophthalmic drops, Apply 1 drop to eye Daily, Disp: , Rfl:     cycloSPORINE (RESTASIS) 0 05 % ophthalmic emulsion, Administer 1 drop to both eyes every 12 (twelve) hours, Disp: , Rfl:     dexlansoprazole (DEXILANT) 60 MG capsule, Take 60 mg by mouth daily, Disp: , Rfl:     dorzolamide-timolol (COSOPT) 2-0 5 % ophthalmic solution, Administer 1 drop to both eyes 2 (two) times a day, Disp: , Rfl:     ELIQUIS 5 MG, TAKE 1 TABLET TWICE A DAY, Disp: 180 tablet, Rfl: 3    hydrochlorothiazide (HYDRODIURIL) 25 mg tablet, Take 25 mg by mouth daily, Disp: , Rfl:     HYDROcodone-acetaminophen (NORCO) 5-325 mg per tablet, Take 1 tablet by mouth 2 (two) times a day as needed for pain, Disp: , Rfl: 0    olopatadine HCl (PATADAY) 0 2 % opth drops, Administer 1 drop to both eyes 2 (two) times a day, Disp: , Rfl:     polyethylene glycol-propylene glycol (SYSTANE) 0 4-0 3 %, every 3 (three) hours as needed, Disp: , Rfl:     ranitidine (ZANTAC) 150 mg tablet, Take 150 mg by mouth daily at bedtime, Disp: , Rfl:     sertraline (ZOLOFT) 100 mg tablet, Take 100 mg by mouth daily, Disp: , Rfl:     sotalol (BETAPACE) 80 mg tablet, TAKE 1 TABLET TWICE A DAY, Disp: 180 tablet, Rfl: 3    tiZANidine (ZANAFLEX) 2 mg tablet, Take 1 tablet by mouth 3 (three) times a day, Disp: , Rfl:     traMADol (ULTRAM-ER) 100 mg 24 hr tablet, , Disp: , Rfl:     traZODone (DESYREL) 150 mg tablet, Take 150 mg by mouth daily at bedtime, Disp: , Rfl:     VENTOLIN  (90 Base) MCG/ACT inhaler, , Disp: , Rfl:   Allergies   Allergen Reactions    Lomotil [Diphenoxylate]        Labs:  No visits with results within 6 Month(s) from this visit  Latest known visit with results is:   Hospital Outpatient Visit on 01/30/2017   Component Date Value    Protocol Name 01/30/2017 [facility] WALK            Time In Exercise Phase 01/30/2017 180     MAX  SYSTOLIC BP 02/69/9013 647     Max Diastolic Bp 90/56/1088 66     Max Heart Rate 01/30/2017 96     Max Predicted Heart Rate 01/30/2017 163     Reason for Termination 01/30/2017 Test Complete     Test Indication 01/30/2017 Screening for CAD     Target Hr Formular 01/30/2017 (220 - Age)*100%     Chest Pain Statement 01/30/2017 none      Imaging: No results found  Review of Systems:  Review of Systems   Constitutional: Negative  HENT: Negative  Eyes: Negative  Respiratory: Negative      Cardiovascular: Positive for palpitations  Negative for chest pain and leg swelling  Gastrointestinal: Positive for abdominal distention  Negative for abdominal pain, anal bleeding, blood in stool, constipation, diarrhea and nausea  Endocrine: Negative  Genitourinary: Negative  Musculoskeletal: Positive for arthralgias, back pain and gait problem  Negative for joint swelling, myalgias, neck pain and neck stiffness  Skin: Negative  Allergic/Immunologic: Negative  Hematological: Negative  Psychiatric/Behavioral: Negative  Physical Exam:  Physical Exam   Constitutional: She is oriented to person, place, and time  She appears well-developed and well-nourished  No distress  HENT:   Head: Normocephalic and atraumatic  Right Ear: External ear normal    Left Ear: External ear normal    Eyes: Pupils are equal, round, and reactive to light  Conjunctivae and EOM are normal  Right eye exhibits no discharge  Left eye exhibits no discharge  Neck: Normal range of motion  Neck supple  No JVD present  No tracheal deviation present  No thyromegaly present  Cardiovascular: Normal rate, regular rhythm and intact distal pulses  Exam reveals no gallop and no friction rub  No murmur heard  Pulmonary/Chest: Effort normal and breath sounds normal  No stridor  No respiratory distress  She has no wheezes  She has no rales  She exhibits no tenderness  Abdominal: Soft  Bowel sounds are normal  She exhibits no distension and no mass  There is no tenderness  There is no rebound and no guarding  Musculoskeletal: Normal range of motion  She exhibits no edema or deformity  Neurological: She is alert and oriented to person, place, and time  No cranial nerve deficit  Coordination normal    Skin: Skin is warm and dry  No rash noted  She is not diaphoretic  No erythema  No pallor

## 2018-11-29 ENCOUNTER — REMOTE DEVICE CLINIC VISIT (OUTPATIENT)
Dept: CARDIOLOGY CLINIC | Facility: CLINIC | Age: 58
End: 2018-11-29
Payer: COMMERCIAL

## 2018-11-29 DIAGNOSIS — Z95.0 CARDIAC PACEMAKER IN SITU: ICD-10-CM

## 2018-11-29 DIAGNOSIS — I49.5 SICK SINUS SYNDROME (HCC): Primary | ICD-10-CM

## 2018-11-29 PROCEDURE — 93296 REM INTERROG EVL PM/IDS: CPT | Performed by: INTERNAL MEDICINE

## 2018-11-29 PROCEDURE — 93294 REM INTERROG EVL PM/LDLS PM: CPT | Performed by: INTERNAL MEDICINE

## 2018-11-30 NOTE — PROGRESS NOTES
Results for orders placed or performed in visit on 11/29/18   Cardiac EP device report    Narrative    MDT DUAL PM  CARELINK TRANSMISSION: BATTERY VOLTAGE ADEQUATE (8 YRS)  AP-98%, -1%  ATRIAL LEAD IMPEDANCE WARNING DUE TO LOW IMPEDANCE ON 10/24/18; SENSE & PACE POLARITY SWITCHED FROM BI TO UNI  IMPEDANCES HAVE BEEN WNL & STABLE SINCE  ALL OTHER AVAILABLE LEAD PARAMETERS WITHIN NORMAL LIMITS  1 NSVT EPISODE FOR 7 BEATS, AVG CL~340MS  EF-68% (NST 1/30/17)  PT ON ELIQUIS & SOTALOL  1 AHR EPISODE LASTING 15 SEC DUE TO [facility] OVERSENSING ([facility] OVERSENSING ALSO ON PRESENTING EGM)  TASKED DR Bob Rios  NORMAL DEVICE FUNCTION   GV

## 2018-12-06 ENCOUNTER — TELEPHONE (OUTPATIENT)
Dept: CARDIOLOGY CLINIC | Facility: CLINIC | Age: 58
End: 2018-12-06

## 2018-12-28 ENCOUNTER — TELEPHONE (OUTPATIENT)
Dept: CARDIOLOGY CLINIC | Facility: CLINIC | Age: 58
End: 2018-12-28

## 2018-12-28 NOTE — TELEPHONE ENCOUNTER
Dr Ruifno Jones, patient to have radiofrequency ablation  Dr Sandi Amaro requesting a 6 day hold on Eliquis  Please advise

## 2019-01-09 NOTE — TELEPHONE ENCOUNTER
Called patient  She had to change doctors due to insurance  The procedure will be done while on Eliquis

## 2019-01-14 DIAGNOSIS — I48.0 PAROXYSMAL ATRIAL FIBRILLATION (HCC): ICD-10-CM

## 2019-01-14 RX ORDER — SOTALOL HYDROCHLORIDE 80 MG/1
80 TABLET ORAL 2 TIMES DAILY
Qty: 180 TABLET | Refills: 2 | Status: SHIPPED | OUTPATIENT
Start: 2019-01-14 | End: 2019-10-18 | Stop reason: SDUPTHER

## 2019-01-28 ENCOUNTER — TELEPHONE (OUTPATIENT)
Dept: CARDIOLOGY CLINIC | Facility: CLINIC | Age: 59
End: 2019-01-28

## 2019-01-28 NOTE — TELEPHONE ENCOUNTER
Pharmacy needs an ok to dispense Sotalol due to being on Tyzanidine,which she has always been on, and asthma  We have a prior ok from you for this in the past, they are looking for a new approval     Is this still ok to fill?

## 2019-02-07 ENCOUNTER — APPOINTMENT (OUTPATIENT)
Dept: RADIOLOGY | Age: 59
End: 2019-02-07
Payer: COMMERCIAL

## 2019-02-07 ENCOUNTER — TRANSCRIBE ORDERS (OUTPATIENT)
Dept: ADMINISTRATIVE | Age: 59
End: 2019-02-07

## 2019-02-07 DIAGNOSIS — M54.14 THORACIC RADICULOPATHY: ICD-10-CM

## 2019-02-07 DIAGNOSIS — I20.0 UNSTABLE ANGINA PECTORIS (HCC): Primary | ICD-10-CM

## 2019-02-07 PROCEDURE — 72072 X-RAY EXAM THORAC SPINE 3VWS: CPT

## 2019-02-07 PROCEDURE — 71046 X-RAY EXAM CHEST 2 VIEWS: CPT

## 2019-03-07 ENCOUNTER — REMOTE DEVICE CLINIC VISIT (OUTPATIENT)
Dept: CARDIOLOGY CLINIC | Facility: CLINIC | Age: 59
End: 2019-03-07
Payer: COMMERCIAL

## 2019-03-07 DIAGNOSIS — Z95.0 PACEMAKER: ICD-10-CM

## 2019-03-07 DIAGNOSIS — I49.5 SSS (SICK SINUS SYNDROME) (HCC): Primary | ICD-10-CM

## 2019-03-07 PROCEDURE — 93294 REM INTERROG EVL PM/LDLS PM: CPT | Performed by: INTERNAL MEDICINE

## 2019-03-07 PROCEDURE — 93296 REM INTERROG EVL PM/IDS: CPT | Performed by: INTERNAL MEDICINE

## 2019-03-11 NOTE — PROGRESS NOTES
Results for orders placed or performed in visit on 03/07/19   Cardiac EP device report    Narrative    MDT-DUAL CHAMBER PPM (AAIR-DDDR MODE)  CARELINK TRANSMISSION: BATTERY ADEQUATE (6 5 YRS)  AP 97%;  7%  ALL AVAILABLE LEAD PARAMETERS WITHIN NORMAL LIMITS  NO EPISODES  NORMAL DEVICE FUNCTION   PL

## 2019-03-28 DIAGNOSIS — E78.5 DYSLIPIDEMIA: ICD-10-CM

## 2019-03-28 RX ORDER — ATORVASTATIN CALCIUM 40 MG/1
TABLET, FILM COATED ORAL
Qty: 90 TABLET | Refills: 3 | Status: SHIPPED | OUTPATIENT
Start: 2019-03-28 | End: 2020-01-15 | Stop reason: SDUPTHER

## 2019-04-15 ENCOUNTER — IN-CLINIC DEVICE VISIT (OUTPATIENT)
Dept: CARDIOLOGY CLINIC | Facility: CLINIC | Age: 59
End: 2019-04-15
Payer: COMMERCIAL

## 2019-04-15 DIAGNOSIS — Z95.0 PRESENCE OF PERMANENT CARDIAC PACEMAKER: ICD-10-CM

## 2019-04-15 DIAGNOSIS — I49.5 SICK SINUS SYNDROME (HCC): Primary | ICD-10-CM

## 2019-04-15 PROCEDURE — 93280 PM DEVICE PROGR EVAL DUAL: CPT | Performed by: INTERNAL MEDICINE

## 2019-05-30 ENCOUNTER — OFFICE VISIT (OUTPATIENT)
Dept: CARDIOLOGY CLINIC | Facility: CLINIC | Age: 59
End: 2019-05-30
Payer: COMMERCIAL

## 2019-05-30 VITALS
HEIGHT: 64 IN | HEART RATE: 63 BPM | SYSTOLIC BLOOD PRESSURE: 116 MMHG | BODY MASS INDEX: 35.34 KG/M2 | DIASTOLIC BLOOD PRESSURE: 78 MMHG | WEIGHT: 207 LBS

## 2019-05-30 DIAGNOSIS — R00.1 SYMPTOMATIC BRADYCARDIA: ICD-10-CM

## 2019-05-30 DIAGNOSIS — I48.0 PAROXYSMAL ATRIAL FIBRILLATION (HCC): Primary | ICD-10-CM

## 2019-05-30 DIAGNOSIS — R09.89 WEAK CAROTID PULSE: ICD-10-CM

## 2019-05-30 DIAGNOSIS — E78.5 DYSLIPIDEMIA: ICD-10-CM

## 2019-05-30 DIAGNOSIS — I48.91 ATRIAL FIBRILLATION, UNSPECIFIED TYPE (HCC): ICD-10-CM

## 2019-05-30 DIAGNOSIS — Z95.0 HISTORY OF PERMANENT CARDIAC PACEMAKER PLACEMENT: ICD-10-CM

## 2019-05-30 DIAGNOSIS — I10 HYPERTENSION, UNSPECIFIED TYPE: ICD-10-CM

## 2019-05-30 PROCEDURE — 99214 OFFICE O/P EST MOD 30 MIN: CPT | Performed by: INTERNAL MEDICINE

## 2019-05-30 PROCEDURE — 93000 ELECTROCARDIOGRAM COMPLETE: CPT | Performed by: INTERNAL MEDICINE

## 2019-05-30 RX ORDER — DOXEPIN HYDROCHLORIDE 6 MG/1
1 TABLET ORAL
COMMUNITY
End: 2020-01-29

## 2019-06-13 ENCOUNTER — OFFICE VISIT (OUTPATIENT)
Dept: PODIATRY | Facility: CLINIC | Age: 59
End: 2019-06-13
Payer: COMMERCIAL

## 2019-06-13 VITALS
WEIGHT: 204 LBS | DIASTOLIC BLOOD PRESSURE: 70 MMHG | SYSTOLIC BLOOD PRESSURE: 111 MMHG | BODY MASS INDEX: 34.83 KG/M2 | HEIGHT: 64 IN

## 2019-06-13 DIAGNOSIS — M77.41 METATARSALGIA OF RIGHT FOOT: Primary | ICD-10-CM

## 2019-06-13 DIAGNOSIS — S93.601A SPRAIN OF RIGHT FOOT, INITIAL ENCOUNTER: ICD-10-CM

## 2019-06-13 DIAGNOSIS — M79.671 RIGHT FOOT PAIN: ICD-10-CM

## 2019-06-13 PROCEDURE — 99213 OFFICE O/P EST LOW 20 MIN: CPT | Performed by: PODIATRIST

## 2019-07-14 DIAGNOSIS — I10 HYPERTENSION, UNSPECIFIED TYPE: ICD-10-CM

## 2019-07-20 RX ORDER — LISINOPRIL AND HYDROCHLOROTHIAZIDE 25; 20 MG/1; MG/1
TABLET ORAL
Qty: 60 TABLET | Refills: 3 | Status: SHIPPED | OUTPATIENT
Start: 2019-07-20 | End: 2020-01-15 | Stop reason: SDUPTHER

## 2019-07-31 ENCOUNTER — REMOTE DEVICE CLINIC VISIT (OUTPATIENT)
Dept: CARDIOLOGY CLINIC | Facility: CLINIC | Age: 59
End: 2019-07-31
Payer: COMMERCIAL

## 2019-07-31 DIAGNOSIS — Z95.0 HISTORY OF PERMANENT CARDIAC PACEMAKER PLACEMENT: ICD-10-CM

## 2019-07-31 PROCEDURE — 93296 REM INTERROG EVL PM/IDS: CPT | Performed by: INTERNAL MEDICINE

## 2019-07-31 PROCEDURE — 93294 REM INTERROG EVL PM/LDLS PM: CPT | Performed by: INTERNAL MEDICINE

## 2019-07-31 NOTE — PROGRESS NOTES
Results for orders placed or performed in visit on 07/31/19   Cardiac EP device report    Narrative    MDT-DUAL CHAMBER PPM (AAIR-DDDR MODE)  CARELINK TRANSMISSION: BATTERY VOLTAGE ADEQUATE  (7 5 YRS)  AP 99%  <1%  ALL AVAILABLE LEAD PARAMETERS WITHIN NORMAL LIMITS  3 VHR EPISODES DETECTED 7 BEATS @ 380ms  PATIENT IS ON SOTALOL AND ELIQUIS; EF 68% (2017, STRESS)  NORMAL DEVICE FUNCTION  ---HERNÁNDEZ

## 2019-09-10 ENCOUNTER — OFFICE VISIT (OUTPATIENT)
Dept: PODIATRY | Facility: CLINIC | Age: 59
End: 2019-09-10
Payer: COMMERCIAL

## 2019-09-10 VITALS
WEIGHT: 196.3 LBS | HEART RATE: 70 BPM | SYSTOLIC BLOOD PRESSURE: 143 MMHG | DIASTOLIC BLOOD PRESSURE: 82 MMHG | BODY MASS INDEX: 33.51 KG/M2 | HEIGHT: 64 IN

## 2019-09-10 DIAGNOSIS — M76.71 PERONEAL TENDINITIS, RIGHT: Primary | ICD-10-CM

## 2019-09-10 DIAGNOSIS — M19.072 PRIMARY OSTEOARTHRITIS OF LEFT FOOT: ICD-10-CM

## 2019-09-10 DIAGNOSIS — M72.2 PLANTAR FASCIITIS: ICD-10-CM

## 2019-09-10 DIAGNOSIS — M79.671 PAIN IN BOTH FEET: ICD-10-CM

## 2019-09-10 DIAGNOSIS — M79.672 PAIN IN BOTH FEET: ICD-10-CM

## 2019-09-10 DIAGNOSIS — S96.212A STRAIN OF INTRINSIC MUSCLE AND TENDON AT ANKLE AND FOOT LEVEL, LEFT FOOT, INITIAL ENCOUNTER: ICD-10-CM

## 2019-09-10 PROCEDURE — 99214 OFFICE O/P EST MOD 30 MIN: CPT | Performed by: PODIATRIST

## 2019-09-10 RX ORDER — TIZANIDINE 4 MG/1
TABLET ORAL
COMMUNITY
Start: 2019-09-06 | End: 2019-09-10 | Stop reason: SDUPTHER

## 2019-09-10 RX ORDER — PANTOPRAZOLE SODIUM 40 MG/1
TABLET, DELAYED RELEASE ORAL
Refills: 2 | COMMUNITY
Start: 2019-07-23 | End: 2021-11-23 | Stop reason: ALTCHOICE

## 2019-09-10 NOTE — PROGRESS NOTES
PATIENT:  Reina Hennessy    1960    ASSESSMENT:     1  Peroneal tendinitis, right  Ambulatory referral to Physical Therapy   2  Plantar fasciitis  Ambulatory referral to Physical Therapy   3  Strain of intrinsic muscle and tendon at ankle and foot level, left foot, initial encounter  Ambulatory referral to Physical Therapy   4  Primary osteoarthritis of left foot     5  Pain in both feet           PLAN:  Patient was counseled and educated on the condition and the diagnosis  Previous X-ray was reviewed  The diagnosis, treatment options and prognosis were discussed with the patient  She has plantar fascia injury / plantar fasciitis on left foot and peroneal tendinitis right foot  Referred her to PT  Instructed supportive care, home exercise, icing, and proper footwear/ arch support  She may use CAM walker as needed  Discussed possible further imaging depending on the progress  Possible injection to left foot  Patient will return in 3 weeks for re-evaluation  Subjective:          HPI  The patient presents with chief complaint of left foot pain for 2-3 days  She had intense, sharp pain on left plantar arch  Pain was 10 out of 10, but had been little better today  The symptoms can be worse in the morning  Pain also increases with walking and standing  The patient does not recall any injury, but reported overuse  Denied any swelling  No associated numbness or paresthesia  No significant weakness  She also still has pain on right lateral foot  It is about 6 out of 10  Topical treatment did not help her  She has chronic pain on left foot and it has been stable as long as she is being careful  The following portions of the patient's history were reviewed and updated as appropriate: allergies, current medications, past family history, past medical history, past social history, past surgical history and problem list   All pertinent labs and images were reviewed      Past Medical History  Past Medical History:   Diagnosis Date    Arthritis     Asthma     Nondisplaced fracture of distal phalanx of left great toe     Osteoarthritis     Other specific joint derangements of right foot, not elsewhere classified        Past Surgical History  Past Surgical History:   Procedure Laterality Date    BREAST LUMPECTOMY      CHOLECYSTECTOMY  1982    FOOT SURGERY  2009    HYSTERECTOMY  2000    MOUTH SURGERY          Allergies:  Lomotil [diphenoxylate]    Medications:  Current Outpatient Medications   Medication Sig Dispense Refill    ALPRAZolam (XANAX) 0 5 mg tablet Take 0 5 mg by mouth 3 (three) times a day as needed for anxiety      atorvastatin (LIPITOR) 40 mg tablet TAKE 1 TABLET AT BEDTIME 90 tablet 3    bimatoprost (LUMIGAN) 0 01 % ophthalmic drops Apply 1 drop to eye Daily      cycloSPORINE (RESTASIS) 0 05 % ophthalmic emulsion Administer 1 drop to both eyes every 12 (twelve) hours      diclofenac sodium (VOLTAREN) 1 % Apply 2 g topically 4 (four) times a day 1 Tube 2    dorzolamide-timolol (COSOPT) 2-0 5 % ophthalmic solution Administer 1 drop to both eyes 2 (two) times a day      Doxepin HCl (SILENOR) 6 MG TABS Take 1 tablet by mouth daily at bedtime      ELIQUIS 5 MG TAKE 1 TABLET TWICE A  tablet 3    HYDROcodone-acetaminophen (NORCO) 5-325 mg per tablet Take 1 tablet by mouth 2 (two) times a day as needed for pain  0    lisinopril-hydrochlorothiazide (PRINZIDE,ZESTORETIC) 20-25 MG per tablet TAKE 1 TABLET DAILY 60 tablet 3    Netarsudil Dimesylate (RHOPRESSA) 0 02 % SOLN Apply 1 drop to eye daily at bedtime      olopatadine HCl (PATADAY) 0 2 % opth drops Administer 1 drop to both eyes 2 (two) times a day      pantoprazole (PROTONIX) 40 mg tablet TAKE 1 TABLET BY MOUTH BEFORE BREAKFAST  2    polyethylene glycol-propylene glycol (SYSTANE) 0 4-0 3 % every 3 (three) hours as needed      ranitidine (ZANTAC) 150 mg tablet Take 150 mg by mouth daily at bedtime      sertraline (ZOLOFT) 100 mg tablet Take 100 mg by mouth daily      sotalol (BETAPACE) 80 mg tablet Take 1 tablet (80 mg total) by mouth 2 (two) times a day 180 tablet 2    traMADol (ULTRAM-ER) 100 mg 24 hr tablet Take 100 mg by mouth daily       VENTOLIN  (90 Base) MCG/ACT inhaler 2 puffs every 4 (four) hours as needed       traZODone (DESYREL) 150 mg tablet Take 150 mg by mouth daily at bedtime       No current facility-administered medications for this visit  Social History:  Social History     Socioeconomic History    Marital status: /Civil Union     Spouse name: None    Number of children: None    Years of education: None    Highest education level: None   Occupational History    None   Social Needs    Financial resource strain: None    Food insecurity:     Worry: None     Inability: None    Transportation needs:     Medical: None     Non-medical: None   Tobacco Use    Smoking status: Former Smoker     Last attempt to quit: 5/2/2009     Years since quitting: 10 3    Smokeless tobacco: Never Used   Substance and Sexual Activity    Alcohol use: No    Drug use: No    Sexual activity: None   Lifestyle    Physical activity:     Days per week: None     Minutes per session: None    Stress: None   Relationships    Social connections:     Talks on phone: None     Gets together: None     Attends Worship service: None     Active member of club or organization: None     Attends meetings of clubs or organizations: None     Relationship status: None    Intimate partner violence:     Fear of current or ex partner: None     Emotionally abused: None     Physically abused: None     Forced sexual activity: None   Other Topics Concern    None   Social History Narrative    None        Review of Systems   Constitutional: Negative for chills and fever  Respiratory: Negative for shortness of breath  Cardiovascular: Negative for chest pain and leg swelling     Gastrointestinal: Negative for constipation, diarrhea, nausea and vomiting  Skin: Negative for color change  Neurological: Negative for weakness and numbness  Objective:      /82   Pulse 70   Ht 5' 4" (1 626 m)   Wt 89 kg (196 lb 4 8 oz)   BMI 33 69 kg/m²          Physical Exam   Constitutional: She is oriented to person, place, and time  She appears well-developed and well-nourished  No distress  HENT:   Head: Normocephalic and atraumatic  Neck: Normal range of motion  Neck supple  Cardiovascular: Normal rate, regular rhythm and intact distal pulses  Pulmonary/Chest: Effort normal and breath sounds normal  No respiratory distress  Musculoskeletal:   Limited left foot ROM s/p MCJ fusion  Pain presents proximal aspect of plantar fascia - medial band left foot  No edema noted  Tenderness noted at the distal peroneus brevis  No acute edema  Neurological: She is alert and oriented to person, place, and time  She exhibits normal muscle tone  Skin: Capillary refill takes less than 2 seconds  No rash noted  No erythema  Psychiatric: She has a normal mood and affect  Her behavior is normal    Vitals reviewed

## 2019-10-18 DIAGNOSIS — I48.0 PAROXYSMAL ATRIAL FIBRILLATION (HCC): ICD-10-CM

## 2019-10-18 RX ORDER — SOTALOL HYDROCHLORIDE 80 MG/1
80 TABLET ORAL 2 TIMES DAILY
Qty: 180 TABLET | Refills: 2 | Status: SHIPPED | OUTPATIENT
Start: 2019-10-18 | End: 2020-10-02

## 2019-10-31 ENCOUNTER — REMOTE DEVICE CLINIC VISIT (OUTPATIENT)
Dept: CARDIOLOGY CLINIC | Facility: CLINIC | Age: 59
End: 2019-10-31
Payer: COMMERCIAL

## 2019-10-31 DIAGNOSIS — Z95.0 PRESENCE OF PERMANENT CARDIAC PACEMAKER: Primary | ICD-10-CM

## 2019-10-31 PROCEDURE — 93296 REM INTERROG EVL PM/IDS: CPT | Performed by: INTERNAL MEDICINE

## 2019-10-31 PROCEDURE — 93279 PRGRMG DEV EVAL PM/LDLS PM: CPT | Performed by: INTERNAL MEDICINE

## 2019-10-31 NOTE — PROGRESS NOTES
Results for orders placed or performed in visit on 10/31/19   Cardiac EP device report    Narrative    MDT-DUAL CHAMBER PPM (AAIR-DDDR MODE)  CARELINK TRANSMISSION: BATTERY VOLTAGE ADEQUATE  (7 5 YRS)  AP 99%  <1%  ALL AVAILABLE LEAD PARAMETERS WITHIN NORMAL LIMITS  3 VHR EPISODES DETECTED 20 BEATS @ 400ms  HISTORY OF SVT  PATIENT IS ON SOTALOL AND ELIQUIS; EF 68% (2017, STRESS)  NORMAL DEVICE FUNCTION  ---HERNÁNDEZ

## 2019-11-04 DIAGNOSIS — I48.0 PAROXYSMAL ATRIAL FIBRILLATION (HCC): ICD-10-CM

## 2019-11-05 RX ORDER — APIXABAN 5 MG/1
TABLET, FILM COATED ORAL
Qty: 180 TABLET | Refills: 3 | Status: SHIPPED | OUTPATIENT
Start: 2019-11-05 | End: 2020-12-09 | Stop reason: SDUPTHER

## 2019-11-29 ENCOUNTER — TELEPHONE (OUTPATIENT)
Dept: CARDIOLOGY CLINIC | Facility: CLINIC | Age: 59
End: 2019-11-29

## 2019-11-29 NOTE — TELEPHONE ENCOUNTER
----- Message from Stevan Duarte MD sent at 11/29/2019  4:00 PM EST -----  Cholesterol levels look good, please let her know

## 2019-12-02 ENCOUNTER — HOSPITAL ENCOUNTER (OUTPATIENT)
Dept: NON INVASIVE DIAGNOSTICS | Facility: CLINIC | Age: 59
Discharge: HOME | End: 2019-12-02
Payer: COMMERCIAL

## 2019-12-02 DIAGNOSIS — R09.89 WEAK CAROTID PULSE: ICD-10-CM

## 2019-12-02 PROCEDURE — 93880 EXTRACRANIAL BILAT STUDY: CPT | Performed by: SURGERY

## 2019-12-02 PROCEDURE — 93880 EXTRACRANIAL BILAT STUDY: CPT

## 2019-12-20 ENCOUNTER — TELEPHONE (OUTPATIENT)
Dept: CARDIOLOGY CLINIC | Facility: CLINIC | Age: 59
End: 2019-12-20

## 2019-12-20 NOTE — TELEPHONE ENCOUNTER
Notes recorded by Víctor Klein MD on 12/10/2019 at 1:51 PM EST  Carotid doppler was good, pl let her know    I called & left a vmm with results

## 2020-01-15 DIAGNOSIS — I10 HYPERTENSION, UNSPECIFIED TYPE: ICD-10-CM

## 2020-01-15 DIAGNOSIS — E78.5 DYSLIPIDEMIA: ICD-10-CM

## 2020-01-16 RX ORDER — LISINOPRIL AND HYDROCHLOROTHIAZIDE 25; 20 MG/1; MG/1
1 TABLET ORAL DAILY
Qty: 180 TABLET | Refills: 1 | Status: SHIPPED | OUTPATIENT
Start: 2020-01-16 | End: 2020-10-01

## 2020-01-16 RX ORDER — ATORVASTATIN CALCIUM 40 MG/1
40 TABLET, FILM COATED ORAL
Qty: 90 TABLET | Refills: 1 | Status: SHIPPED | OUTPATIENT
Start: 2020-01-16 | End: 2021-01-27

## 2020-01-21 ENCOUNTER — TELEPHONE (OUTPATIENT)
Dept: CARDIOLOGY CLINIC | Facility: CLINIC | Age: 60
End: 2020-01-21

## 2020-01-21 NOTE — TELEPHONE ENCOUNTER
[place] called from [facility] needing clarification directions and quantity mismatch on Lisinopril HCTZ 20/25 mg  Please call [place] back at [phone]

## 2020-01-29 ENCOUNTER — OFFICE VISIT (OUTPATIENT)
Dept: CARDIOLOGY CLINIC | Facility: CLINIC | Age: 60
End: 2020-01-29
Payer: COMMERCIAL

## 2020-01-29 VITALS
WEIGHT: 196 LBS | BODY MASS INDEX: 33.46 KG/M2 | HEIGHT: 64 IN | DIASTOLIC BLOOD PRESSURE: 80 MMHG | SYSTOLIC BLOOD PRESSURE: 126 MMHG | HEART RATE: 64 BPM

## 2020-01-29 DIAGNOSIS — I10 HYPERTENSION, UNSPECIFIED TYPE: ICD-10-CM

## 2020-01-29 DIAGNOSIS — Z95.0 HISTORY OF PERMANENT CARDIAC PACEMAKER PLACEMENT: ICD-10-CM

## 2020-01-29 DIAGNOSIS — Z01.810 PREOP CARDIOVASCULAR EXAM: Primary | ICD-10-CM

## 2020-01-29 DIAGNOSIS — I48.0 PAROXYSMAL ATRIAL FIBRILLATION (HCC): ICD-10-CM

## 2020-01-29 DIAGNOSIS — E78.5 DYSLIPIDEMIA: ICD-10-CM

## 2020-01-29 DIAGNOSIS — Z01.818 PREOPERATIVE EVALUATION FOR TUBAL LIGATION: ICD-10-CM

## 2020-01-29 PROCEDURE — 3079F DIAST BP 80-89 MM HG: CPT | Performed by: INTERNAL MEDICINE

## 2020-01-29 PROCEDURE — 3074F SYST BP LT 130 MM HG: CPT | Performed by: INTERNAL MEDICINE

## 2020-01-29 PROCEDURE — 93000 ELECTROCARDIOGRAM COMPLETE: CPT | Performed by: INTERNAL MEDICINE

## 2020-01-29 PROCEDURE — 99214 OFFICE O/P EST MOD 30 MIN: CPT | Performed by: INTERNAL MEDICINE

## 2020-01-29 RX ORDER — TIZANIDINE 4 MG/1
4 TABLET ORAL EVERY 8 HOURS PRN
Refills: 3 | COMMUNITY
Start: 2019-12-09 | End: 2021-11-23 | Stop reason: ALTCHOICE

## 2020-01-29 NOTE — LETTER
January 29, 2020     Monalisa Bernard, [address] [facility] [address] [facility]    Patient: Yanet Hagen   YOB: 1960   Date of Visit: 1/29/2020       Dear Dr Nia Connelly: Thank you for referring Kadeem Castillo to me for evaluation  Below are my notes for this consultation  If you have questions, please do not hesitate to call me  I look forward to following your patient along with you  Sincerely,        Kimberly Jim MD        CC: MD Kimberly Helms MD  1/29/2020  1:58 PM  Sign at close encounter      [address]  [address] [facility] [address]      PATIENT NAME: Yanet Hagen; [id number]    YOB: 1960    DATE LAST EVALUATED:   1/29/2020    DETAILS OF SURGERY:  Right knee arthroscopic surgery - chondroplasty and debridement of meniscal cyst     DATE OF SURGERY:  To be decided    SURGEON: Dr Joe Alas: Choice     CARDIAC RISK ASSESSMENT:    Can proceed at Low risk without further testing which is unlikely to change the management  ANTIPLATELET/ANTICOAGULANT AGENTS:  Can discontinue Eliquis 2 days prior to the surgery- can miss a total of 4 doses prior and resume when deemed safe from a postsurgical bleeding standpoint  Please call us with any questions or concerns regarding his management  Kimberly Jim MD  1/29/2020  1:57 PM  Sign at close encounter                                             Cardiology Follow Up    Yanet Hagen  1960  [id number]  [address] [place] [address]  [phone]  [phone]    1  Preop cardiovascular exam  POCT ECG   2  Paroxysmal atrial fibrillation (HCC)  POCT ECG       Diagnoses and all orders for this visit:    Preop cardiovascular exam  -     POCT ECG    Paroxysmal atrial fibrillation (HCC)  -     POCT ECG    Other orders  -     tiZANidine (ZANAFLEX) 4 mg tablet;  Take 4 mg by mouth every 8 (eight) hours as needed       I had the pleasure of seeing Dagoberto Romo for a follow up visit  Interval History: is now on weight watchers, lost 8 lb over 3 weeks    History of the presenting illness, Discussion/Summary and My Plan are as follows:::    She is 60 with a history of sinus bradycardia/sick sinus syndrome as well as paroxysmal atrial fibrillation - on Sotalol and Eliquis, S/P Medtronic MRI compatible dual-chamber permanent pacemaker implantation in Dec 2016  She also has a history of Lyme's disease-previously positive for IgG  She also has a history of gastroparesis       She presents for follow-up visit and denies any cardiac symptoms  BP is now well controlled  ECG shows normal rhythm with normal intervals  Sept 2019 - swimming pool accident -jumped and landed on her right foot, injuring her right knee meniscus, will be undergoing arthroscopic repair at [facility]      Plan:    Preprocedure evaluation prior to arthroscopic repair of the right knee - chondroplasty and debridement of meniscal cyst: Can proceed at low cardiac risk without further cardiac testing  Can discontinue Eliquis 2 days prior to the surgery  She remains asymptomatic  From a cardiac standpoint, currently at modest amounts of activity, in the past was at least moderately active doing her housework, grocery shopping etc without any cardiac symptoms      Sick sinus syndrome/sinus bradycardia: Currently normal sinus rhythm  Asymptomatic at this time  Status post Medtronic dual-chamber permanent pacemaker-MRI compatible     Atrial fibrillation and palpitations now: Paroxysmal, currently maintained in sinus rhythm/Atrial paced rhytm (98 %) (A spikes are hard to visualize) on sotalol 80 mg bid  Normal intervals on ECG  Maintained on Eliquis    Episodes of atrial fibrillation are rare and all less than 2 minutes-last 2 interrogations-July and October 2019     Has symptoms of sleep apnea: has mild SENTHIL - not on CPAP     Status post Medtronic MRI compatible dual-chamber permanent pacemaker implantation: Normal function  No atrial fibrillation on last interrogation in July 2019 and Oct 2019 No changes to management at this time  She had a normal echocardiogram-November 2016 and a negative nuclear stress test-January 2017     History of Hypertension: well controlled on Sotalol and lisinopril 20/hydrochlorothiazide 25     Dyslipidemia:   November 2019-total cholesterol 193, triglycerides 151, HDL 78, LDL 85  February 2018:   Total cholesterol 193, triglycerides 115, HDL 88, LDL 82, recheck in 6 months - on weight watchers, losing weight on her home scale    Weak carotid impulses:  Negative carotid Dopplers December 2019     Follow up in 9 months      Patient Active Problem List   Diagnosis    Symptomatic bradycardia    Atrial fibrillation (HCC)    Paroxysmal atrial fibrillation (HCC)    History of permanent cardiac pacemaker placement    Dyslipidemia    Hypertension    Palpitations    Weak carotid pulse     Past Medical History:   Diagnosis Date    Arthritis     Asthma     Nondisplaced fracture of distal phalanx of left great toe     Osteoarthritis     Other specific joint derangements of right foot, not elsewhere classified      Social History     Socioeconomic History    Marital status: /Civil Union     Spouse name: Not on file    Number of children: Not on file    Years of education: Not on file    Highest education level: Not on file   Occupational History    Not on file   Social Needs    Financial resource strain: Not on file    Food insecurity:     Worry: Not on file     Inability: Not on file    Transportation needs:     Medical: Not on file     Non-medical: Not on file   Tobacco Use    Smoking status: Former Smoker     Last attempt to quit: 5/2/2009     Years since quitting: 10 7    Smokeless tobacco: Never Used   Substance and Sexual Activity    Alcohol use: No    Drug use: No    Sexual activity: Not on file   Lifestyle    Physical activity:     Days per week: Not on file     Minutes per session: Not on file    Stress: Not on file   Relationships    Social connections:     Talks on phone: Not on file     Gets together: Not on file     Attends Evangelical service: Not on file     Active member of club or organization: Not on file     Attends meetings of clubs or organizations: Not on file     Relationship status: Not on file    Intimate partner violence:     Fear of current or ex partner: Not on file     Emotionally abused: Not on file     Physically abused: Not on file     Forced sexual activity: Not on file   Other Topics Concern    Not on file   Social History Narrative    Not on file      Family History   Problem Relation Age of Onset    Diabetes Family     Cancer Family     Hypertension Family     Arthritis Family      Past Surgical History:   Procedure Laterality Date    BREAST LUMPECTOMY      [address]  2009    HYSTERECTOMY  2000    MOUTH SURGERY         Current Outpatient Medications:     ALPRAZolam (XANAX) 0 5 mg tablet, Take 0 5 mg by mouth 3 (three) times a day as needed for anxiety, Disp: , Rfl:     atorvastatin (LIPITOR) 40 mg tablet, Take 1 tablet (40 mg total) by mouth daily at bedtime, Disp: 90 tablet, Rfl: 1    bimatoprost (LUMIGAN) 0 01 % ophthalmic drops, Administer 1 drop to both eyes Daily , Disp: , Rfl:     cycloSPORINE (RESTASIS) 0 05 % ophthalmic emulsion, Administer 1 drop to both eyes every 12 (twelve) hours, Disp: , Rfl:     dorzolamide-timolol (COSOPT) 2-0 5 % ophthalmic solution, Administer 1 drop to both eyes 2 (two) times a day, Disp: , Rfl:     ELIQUIS 5 MG, TAKE 1 TABLET TWICE A DAY, Disp: 180 tablet, Rfl: 3    HYDROcodone-acetaminophen (NORCO) 5-325 mg per tablet, Take 1 tablet by mouth 2 (two) times a day as needed for pain, Disp: , Rfl: 0    lisinopril-hydrochlorothiazide (PRINZIDE,ZESTORETIC) 20-25 MG per tablet, Take 1 tablet by mouth daily, Disp: 180 tablet, Rfl: 1    Netarsudil Dimesylate (RHOPRESSA) 0 02 % SOLN, Apply 1 drop to eye daily at bedtime, Disp: , Rfl:     olopatadine HCl (PATADAY) 0 2 % opth drops, Administer 1 drop to both eyes 2 (two) times a day, Disp: , Rfl:     pantoprazole (PROTONIX) 40 mg tablet, TAKE 1 TABLET BY MOUTH BEFORE BREAKFAST, Disp: , Rfl: 2    polyethylene glycol-propylene glycol (SYSTANE) 0 4-0 3 %, Administer 1 drop to both eyes every 3 (three) hours as needed , Disp: , Rfl:     sertraline (ZOLOFT) 100 mg tablet, Take 100 mg by mouth daily, Disp: , Rfl:     sotalol (BETAPACE) 80 mg tablet, TAKE 1 TABLET (80 MG TOTAL) BY MOUTH 2 (TWO) TIMES A DAY, Disp: 180 tablet, Rfl: 2    tiZANidine (ZANAFLEX) 4 mg tablet, Take 4 mg by mouth every 8 (eight) hours as needed , Disp: , Rfl: 3    traMADol (ULTRAM-ER) 100 mg 24 hr tablet, Take 100 mg by mouth daily , Disp: , Rfl:     VENTOLIN  (90 Base) MCG/ACT inhaler, 2 puffs every 4 (four) hours as needed , Disp: , Rfl:     diclofenac sodium (VOLTAREN) 1 %, Apply 2 g topically 4 (four) times a day (Patient not taking: Reported on 1/29/2020), Disp: 1 Tube, Rfl: 2    traZODone (DESYREL) 150 mg tablet, Take 150 mg by mouth daily at bedtime, Disp: , Rfl:   Allergies   Allergen Reactions    Lomotil [Diphenoxylate]        Imaging: No results found  Review of Systems:  Review of Systems   Constitutional: Negative  HENT: Negative  Eyes: Negative  Respiratory: Negative  Cardiovascular: Negative  Endocrine: Negative  Musculoskeletal: Positive for arthralgias and gait problem  Negative for back pain  Hematological: Negative  Physical Exam:  /80 (BP Location: Right arm, Patient Position: Sitting, Cuff Size: Large)   Pulse 64   Ht 5' 4" (1 626 m)   Wt 88 9 kg (196 lb)   BMI 33 64 kg/m²    Physical Exam   Constitutional: She appears well-developed and well-nourished  No distress     HENT:   Head: Normocephalic and atraumatic  Eyes: Pupils are equal, round, and reactive to light  Conjunctivae are normal  Right eye exhibits no discharge  Left eye exhibits no discharge  No scleral icterus  Neck: Normal range of motion  No JVD present  No tracheal deviation present  No thyromegaly present  Cardiovascular: Normal rate, regular rhythm and normal heart sounds  Exam reveals no gallop and no friction rub  No murmur heard  Pulmonary/Chest: Effort normal and breath sounds normal  No stridor  No respiratory distress  She has no wheezes  Abdominal: Soft  Bowel sounds are normal  She exhibits no distension  There is no tenderness  Musculoskeletal: Normal range of motion  She exhibits no edema or deformity  Neurological: She is alert  She displays normal reflexes  No cranial nerve deficit  Coordination normal    Skin: Skin is warm  She is not diaphoretic  No erythema  No pallor  Psychiatric: She has a normal mood and affect

## 2020-01-29 NOTE — PROGRESS NOTES
Cardiology Follow Up    Trudi Babin  1960  [id number]  [address]  [phone]  [phone]    1  Preop cardiovascular exam  POCT ECG   2  Paroxysmal atrial fibrillation (HCC)  POCT ECG       Diagnoses and all orders for this visit:    Preop cardiovascular exam  -     POCT ECG    Paroxysmal atrial fibrillation (HCC)  -     POCT ECG    Other orders  -     tiZANidine (ZANAFLEX) 4 mg tablet; Take 4 mg by mouth every 8 (eight) hours as needed       I had the pleasure of seeing Trudi Babin for a follow up visit  Interval History: is now on weight watchers, lost 8 lb over 3 weeks    History of the presenting illness, Discussion/Summary and My Plan are as follows:::    She is 60 with a history of sinus bradycardia/sick sinus syndrome as well as paroxysmal atrial fibrillation - on Sotalol and Eliquis, S/P Medtronic MRI compatible dual-chamber permanent pacemaker implantation in Dec 2016  She also has a history of Lyme's disease-previously positive for IgG  She also has a history of gastroparesis       She presents for follow-up visit and denies any cardiac symptoms  BP is now well controlled  ECG shows normal rhythm with normal intervals  Sept 2019 - swimming pool accident -jumped and landed on her right foot, injuring her right knee meniscus, will be undergoing arthroscopic repair at [facility]      Plan:    Preprocedure evaluation prior to arthroscopic repair of the right knee - chondroplasty and debridement of meniscal cyst: Can proceed at low cardiac risk without further cardiac testing  Can discontinue Eliquis 2 days prior to the surgery   She remains asymptomatic  From a cardiac standpoint, currently at modest amounts of activity, in the past was at least moderately active doing her housework, grocery shopping etc without any cardiac symptoms      Sick sinus syndrome/sinus bradycardia: Currently normal sinus rhythm  Asymptomatic at this time  Status post Medtronic dual-chamber permanent pacemaker-MRI compatible     Atrial fibrillation and palpitations now: Paroxysmal, currently maintained in sinus rhythm/Atrial paced rhytm (98 %) (A spikes are hard to visualize) on sotalol 80 mg bid  Normal intervals on ECG  Maintained on Eliquis  Episodes of atrial fibrillation are rare and all less than 2 minutes-last 2 interrogations-July and October 2019     Has symptoms of sleep apnea: has mild SENTHIL - not on CPAP     Status post Medtronic MRI compatible dual-chamber permanent pacemaker implantation: Normal function  No atrial fibrillation on last interrogation in July 2019 and Oct 2019 No changes to management at this time  She had a normal echocardiogram-November 2016 and a negative nuclear stress test-January 2017     History of Hypertension: well controlled on Sotalol and lisinopril 20/hydrochlorothiazide 25     Dyslipidemia:   November 2019-total cholesterol 193, triglycerides 151, HDL 78, LDL 85  February 2018:   Total cholesterol 193, triglycerides 115, HDL 88, LDL 82, recheck in 6 months - on weight watchers, losing weight on her home scale    Weak carotid impulses:  Negative carotid Dopplers December 2019     Follow up in 9 months      Patient Active Problem List   Diagnosis    Symptomatic bradycardia    Atrial fibrillation (HCC)    Paroxysmal atrial fibrillation (HCC)    History of permanent cardiac pacemaker placement    Dyslipidemia    Hypertension    Palpitations    Weak carotid pulse     Past Medical History:   Diagnosis Date    Arthritis     Asthma     Nondisplaced fracture of distal phalanx of left great toe     Osteoarthritis     Other specific joint derangements of right foot, not elsewhere classified      Social History     Socioeconomic History    Marital status: /Civil Union     Spouse name: Not on file    Number of children: Not on file    Years of education: Not on file    Highest education level: Not on file   Occupational History    Not on file   Social Needs    Financial resource strain: Not on file    Food insecurity:     Worry: Not on file     Inability: Not on file    Transportation needs:     Medical: Not on file     Non-medical: Not on file   Tobacco Use    Smoking status: Former Smoker     Last attempt to quit: 5/2/2009     Years since quitting: 10 7    Smokeless tobacco: Never Used   Substance and Sexual Activity    Alcohol use: No    Drug use: No    Sexual activity: Not on file   Lifestyle    Physical activity:     Days per week: Not on file     Minutes per session: Not on file    Stress: Not on file   Relationships    Social connections:     Talks on phone: Not on file     Gets together: Not on file     Attends Restorationism service: Not on file     Active member of club or organization: Not on file     Attends meetings of clubs or organizations: Not on file     Relationship status: Not on file    Intimate partner violence:     Fear of current or ex partner: Not on file     Emotionally abused: Not on file     Physically abused: Not on file     Forced sexual activity: Not on file   Other Topics Concern    Not on file   Social History Narrative    Not on file      Family History   Problem Relation Age of Onset    Diabetes Family     Cancer Family     Hypertension Family     Arthritis Family      Past Surgical History:   Procedure Laterality Date    BREAST LUMPECTOMY      CHOLECYSTECTOMY  1982    FOOT SURGERY  2009    HYSTERECTOMY  2000    MOUTH SURGERY         Current Outpatient Medications:     ALPRAZolam (XANAX) 0 5 mg tablet, Take 0 5 mg by mouth 3 (three) times a day as needed for anxiety, Disp: , Rfl:     atorvastatin (LIPITOR) 40 mg tablet, Take 1 tablet (40 mg total) by mouth daily at bedtime, Disp: 90 tablet, Rfl: 1    bimatoprost (LUMIGAN) 0 01 % ophthalmic drops, Administer 1 drop to both eyes Daily , Disp: , Rfl:   cycloSPORINE (RESTASIS) 0 05 % ophthalmic emulsion, Administer 1 drop to both eyes every 12 (twelve) hours, Disp: , Rfl:     dorzolamide-timolol (COSOPT) 2-0 5 % ophthalmic solution, Administer 1 drop to both eyes 2 (two) times a day, Disp: , Rfl:     ELIQUIS 5 MG, TAKE 1 TABLET TWICE A DAY, Disp: 180 tablet, Rfl: 3    HYDROcodone-acetaminophen (NORCO) 5-325 mg per tablet, Take 1 tablet by mouth 2 (two) times a day as needed for pain, Disp: , Rfl: 0    lisinopril-hydrochlorothiazide (PRINZIDE,ZESTORETIC) 20-25 MG per tablet, Take 1 tablet by mouth daily, Disp: 180 tablet, Rfl: 1    Netarsudil Dimesylate (RHOPRESSA) 0 02 % SOLN, Apply 1 drop to eye daily at bedtime, Disp: , Rfl:     olopatadine HCl (PATADAY) 0 2 % opth drops, Administer 1 drop to both eyes 2 (two) times a day, Disp: , Rfl:     pantoprazole (PROTONIX) 40 mg tablet, TAKE 1 TABLET BY MOUTH BEFORE BREAKFAST, Disp: , Rfl: 2    polyethylene glycol-propylene glycol (SYSTANE) 0 4-0 3 %, Administer 1 drop to both eyes every 3 (three) hours as needed , Disp: , Rfl:     sertraline (ZOLOFT) 100 mg tablet, Take 100 mg by mouth daily, Disp: , Rfl:     sotalol (BETAPACE) 80 mg tablet, TAKE 1 TABLET (80 MG TOTAL) BY MOUTH 2 (TWO) TIMES A DAY, Disp: 180 tablet, Rfl: 2    tiZANidine (ZANAFLEX) 4 mg tablet, Take 4 mg by mouth every 8 (eight) hours as needed , Disp: , Rfl: 3    traMADol (ULTRAM-ER) 100 mg 24 hr tablet, Take 100 mg by mouth daily , Disp: , Rfl:     VENTOLIN  (90 Base) MCG/ACT inhaler, 2 puffs every 4 (four) hours as needed , Disp: , Rfl:     diclofenac sodium (VOLTAREN) 1 %, Apply 2 g topically 4 (four) times a day (Patient not taking: Reported on 1/29/2020), Disp: 1 Tube, Rfl: 2    traZODone (DESYREL) 150 mg tablet, Take 150 mg by mouth daily at bedtime, Disp: , Rfl:   Allergies   Allergen Reactions    Lomotil [Diphenoxylate]        Imaging: No results found      Review of Systems:  Review of Systems   Constitutional: Negative  HENT: Negative  Eyes: Negative  Respiratory: Negative  Cardiovascular: Negative  Endocrine: Negative  Musculoskeletal: Positive for arthralgias and gait problem  Negative for back pain  Hematological: Negative  Physical Exam:  /80 (BP Location: Right arm, Patient Position: Sitting, Cuff Size: Large)   Pulse 64   Ht 5' 4" (1 626 m)   Wt 88 9 kg (196 lb)   BMI 33 64 kg/m²   Physical Exam   Constitutional: She appears well-developed and well-nourished  No distress  HENT:   Head: Normocephalic and atraumatic  Eyes: Pupils are equal, round, and reactive to light  Conjunctivae are normal  Right eye exhibits no discharge  Left eye exhibits no discharge  No scleral icterus  Neck: Normal range of motion  No JVD present  No tracheal deviation present  No thyromegaly present  Cardiovascular: Normal rate, regular rhythm and normal heart sounds  Exam reveals no gallop and no friction rub  No murmur heard  Pulmonary/Chest: Effort normal and breath sounds normal  No stridor  No respiratory distress  She has no wheezes  Abdominal: Soft  Bowel sounds are normal  She exhibits no distension  There is no tenderness  Musculoskeletal: Normal range of motion  She exhibits no edema or deformity  Neurological: She is alert  She displays normal reflexes  No cranial nerve deficit  Coordination normal    Skin: Skin is warm  She is not diaphoretic  No erythema  No pallor  Psychiatric: She has a normal mood and affect

## 2020-01-29 NOTE — PROGRESS NOTES
HEART & VASCULAR [facility] [address] [address]      PATIENT NAME: Trudi Babin; [id number]    YOB: 1960    DATE LAST EVALUATED:   1/29/2020    DETAILS OF SURGERY:  Right knee arthroscopic surgery - chondroplasty and debridement of meniscal cyst     DATE OF SURGERY:  To be decided    SURGEON: Dr Verito Garcia: Choice     CARDIAC RISK ASSESSMENT:    Can proceed at Low risk without further testing which is unlikely to change the management  ANTIPLATELET/ANTICOAGULANT AGENTS:  Can discontinue Eliquis 2 days prior to the surgery- can miss a total of 4 doses prior and resume when deemed safe from a postsurgical bleeding standpoint  Please call us with any questions or concerns regarding his management

## 2020-01-30 ENCOUNTER — REMOTE DEVICE CLINIC VISIT (OUTPATIENT)
Dept: CARDIOLOGY CLINIC | Facility: CLINIC | Age: 60
End: 2020-01-30
Payer: COMMERCIAL

## 2020-01-30 DIAGNOSIS — Z95.0 PRESENCE OF CARDIAC PACEMAKER: Primary | ICD-10-CM

## 2020-01-30 PROCEDURE — 93296 REM INTERROG EVL PM/IDS: CPT | Performed by: INTERNAL MEDICINE

## 2020-01-30 PROCEDURE — 93294 REM INTERROG EVL PM/LDLS PM: CPT | Performed by: INTERNAL MEDICINE

## 2020-01-30 NOTE — PROGRESS NOTES
Results for orders placed or performed in visit on 01/30/20   Cardiac EP device report    Narrative    MDT-DUAL CHAMBER PPM (AAIR-DDDR MODE)ACTIVE SYSTEM IS MRI CONDITIONAL  CARELINK TRANSMISSION: BATTERY VOLTAGE ADEQUATE (7 5 YRS)  AP: 98 8%  : <0 1% (MVP-ON)  ALL AVAILABLE LEAD PARAMETERS WITHIN NORMAL LIMITS  NO SIGNIFICANT HIGH RATE EPISODES  PACEMAKER FUNCTIONING APPROPRIATELY    [address] [place] [address]

## 2020-04-30 ENCOUNTER — TELEPHONE (OUTPATIENT)
Dept: CARDIOLOGY CLINIC | Facility: CLINIC | Age: 60
End: 2020-04-30

## 2020-05-05 ENCOUNTER — REMOTE DEVICE CLINIC VISIT (OUTPATIENT)
Dept: CARDIOLOGY CLINIC | Facility: CLINIC | Age: 60
End: 2020-05-05
Payer: COMMERCIAL

## 2020-05-05 DIAGNOSIS — Z95.0 PRESENCE OF CARDIAC PACEMAKER: Primary | ICD-10-CM

## 2020-05-05 PROCEDURE — 93294 REM INTERROG EVL PM/LDLS PM: CPT | Performed by: INTERNAL MEDICINE

## 2020-05-05 PROCEDURE — 93296 REM INTERROG EVL PM/IDS: CPT | Performed by: INTERNAL MEDICINE

## 2020-06-17 ENCOUNTER — TELEPHONE (OUTPATIENT)
Dept: CARDIOLOGY CLINIC | Facility: CLINIC | Age: 60
End: 2020-06-17

## 2020-06-18 ENCOUNTER — TELEPHONE (OUTPATIENT)
Dept: CARDIOLOGY CLINIC | Facility: CLINIC | Age: 60
End: 2020-06-18

## 2020-06-19 ENCOUNTER — DOCUMENTATION (OUTPATIENT)
Dept: CARDIOLOGY CLINIC | Facility: CLINIC | Age: 60
End: 2020-06-19

## 2020-06-19 ENCOUNTER — TELEPHONE (OUTPATIENT)
Dept: CARDIOLOGY CLINIC | Facility: CLINIC | Age: 60
End: 2020-06-19

## 2020-08-06 ENCOUNTER — REMOTE DEVICE CLINIC VISIT (OUTPATIENT)
Dept: CARDIOLOGY CLINIC | Facility: CLINIC | Age: 60
End: 2020-08-06
Payer: COMMERCIAL

## 2020-08-06 DIAGNOSIS — Z95.0 CARDIAC PACEMAKER IN SITU: Primary | ICD-10-CM

## 2020-08-06 PROCEDURE — 93294 REM INTERROG EVL PM/LDLS PM: CPT | Performed by: INTERNAL MEDICINE

## 2020-08-06 PROCEDURE — 93296 REM INTERROG EVL PM/IDS: CPT | Performed by: INTERNAL MEDICINE

## 2020-08-06 NOTE — PROGRESS NOTES
Results for orders placed or performed in visit on 08/06/20   Cardiac EP device report    Narrative    MDT-DUAL CHAMBER PPM (AAIR-DDDR MODE)ACTIVE SYSTEM IS MRI CONDITIONAL  CARELINK TRANSMISSION: BATTERY STATUS "OK"  AP 98%  0%  ALL AVAILABLE LEAD PARAMETERS WITHIN NORMAL LIMITS  3 VHRS NOTED  AVAIL EGRAMS PRESENT AS SVT & NSVT  PT ON SOTALOL & ELIQUIS  DR ESPANA MADE AWARE  EF 68% (2017 NUC)  NORMAL DEVICE FUNCTION   NC       Current Outpatient Medications:     ALPRAZolam (XANAX) 0 5 mg tablet, Take 0 5 mg by mouth 3 (three) times a day as needed for anxiety, Disp: , Rfl:     atorvastatin (LIPITOR) 40 mg tablet, Take 1 tablet (40 mg total) by mouth daily at bedtime, Disp: 90 tablet, Rfl: 1    bimatoprost (LUMIGAN) 0 01 % ophthalmic drops, Administer 1 drop to both eyes Daily , Disp: , Rfl:     cycloSPORINE (RESTASIS) 0 05 % ophthalmic emulsion, Administer 1 drop to both eyes every 12 (twelve) hours, Disp: , Rfl:     diclofenac sodium (VOLTAREN) 1 %, Apply 2 g topically 4 (four) times a day (Patient not taking: Reported on 1/29/2020), Disp: 1 Tube, Rfl: 2    dorzolamide-timolol (COSOPT) 2-0 5 % ophthalmic solution, Administer 1 drop to both eyes 2 (two) times a day, Disp: , Rfl:     ELIQUIS 5 MG, TAKE 1 TABLET TWICE A DAY, Disp: 180 tablet, Rfl: 3    HYDROcodone-acetaminophen (NORCO) 5-325 mg per tablet, Take 1 tablet by mouth 2 (two) times a day as needed for pain, Disp: , Rfl: 0    lisinopril-hydrochlorothiazide (PRINZIDE,ZESTORETIC) 20-25 MG per tablet, Take 1 tablet by mouth daily, Disp: 180 tablet, Rfl: 1    Netarsudil Dimesylate (RHOPRESSA) 0 02 % SOLN, Apply 1 drop to eye daily at bedtime, Disp: , Rfl:     olopatadine HCl (PATADAY) 0 2 % opth drops, Administer 1 drop to both eyes 2 (two) times a day, Disp: , Rfl:     pantoprazole (PROTONIX) 40 mg tablet, TAKE 1 TABLET BY MOUTH BEFORE BREAKFAST, Disp: , Rfl: 2    polyethylene glycol-propylene glycol (SYSTANE) 0 4-0 3 %, Administer 1 drop to both eyes every 3 (three) hours as needed , Disp: , Rfl:     sertraline (ZOLOFT) 100 mg tablet, Take 100 mg by mouth daily, Disp: , Rfl:     sotalol (BETAPACE) 80 mg tablet, TAKE 1 TABLET (80 MG TOTAL) BY MOUTH 2 (TWO) TIMES A DAY, Disp: 180 tablet, Rfl: 2    tiZANidine (ZANAFLEX) 4 mg tablet, Take 4 mg by mouth every 8 (eight) hours as needed , Disp: , Rfl: 3    traMADol (ULTRAM-ER) 100 mg 24 hr tablet, Take 100 mg by mouth daily , Disp: , Rfl:     traZODone (DESYREL) 150 mg tablet, Take 150 mg by mouth daily at bedtime, Disp: , Rfl:     VENTOLIN  (90 Base) MCG/ACT inhaler, 2 puffs every 4 (four) hours as needed , Disp: , Rfl:

## 2020-09-14 ENCOUNTER — IN-CLINIC DEVICE VISIT (OUTPATIENT)
Dept: CARDIOLOGY CLINIC | Facility: CLINIC | Age: 60
End: 2020-09-14
Payer: COMMERCIAL

## 2020-09-14 DIAGNOSIS — Z95.0 CARDIAC PACEMAKER IN SITU: Primary | ICD-10-CM

## 2020-09-14 PROCEDURE — 93280 PM DEVICE PROGR EVAL DUAL: CPT | Performed by: INTERNAL MEDICINE

## 2020-09-14 NOTE — PROGRESS NOTES
Results for orders placed or performed in visit on 09/14/20   Cardiac EP device report    Narrative    MDT-DUAL CHAMBER PPM (AAIR-DDDR MODE)ACTIVE SYSTEM IS MRI CONDITIONAL  DEVICE INTERROGATED IN THE [place] OFFICE: BATTERY VOLTAGE ADEQUATE  AP 98%  0%  ALL AVAILABLE LEAD PARAMETERS WITHIN NORMAL LIMITS  1 NSVT NOTED  PT ON SOTALOL & EF 68% (2017 NUC)  NO PROGRAMMING CHANGES MADE TO DEVICE PARAMETERS  NORMAL DEVICE FUNCTION   NC       Current Outpatient Medications:     ALPRAZolam (XANAX) 0 5 mg tablet, Take 0 5 mg by mouth 3 (three) times a day as needed for anxiety, Disp: , Rfl:     atorvastatin (LIPITOR) 40 mg tablet, Take 1 tablet (40 mg total) by mouth daily at bedtime, Disp: 90 tablet, Rfl: 1    bimatoprost (LUMIGAN) 0 01 % ophthalmic drops, Administer 1 drop to both eyes Daily , Disp: , Rfl:     cycloSPORINE (RESTASIS) 0 05 % ophthalmic emulsion, Administer 1 drop to both eyes every 12 (twelve) hours, Disp: , Rfl:     diclofenac sodium (VOLTAREN) 1 %, Apply 2 g topically 4 (four) times a day (Patient not taking: Reported on 1/29/2020), Disp: 1 Tube, Rfl: 2    dorzolamide-timolol (COSOPT) 2-0 5 % ophthalmic solution, Administer 1 drop to both eyes 2 (two) times a day, Disp: , Rfl:     ELIQUIS 5 MG, TAKE 1 TABLET TWICE A DAY, Disp: 180 tablet, Rfl: 3    HYDROcodone-acetaminophen (NORCO) 5-325 mg per tablet, Take 1 tablet by mouth 2 (two) times a day as needed for pain, Disp: , Rfl: 0    lisinopril-hydrochlorothiazide (PRINZIDE,ZESTORETIC) 20-25 MG per tablet, Take 1 tablet by mouth daily, Disp: 180 tablet, Rfl: 1    Netarsudil Dimesylate (RHOPRESSA) 0 02 % SOLN, Apply 1 drop to eye daily at bedtime, Disp: , Rfl:     olopatadine HCl (PATADAY) 0 2 % opth drops, Administer 1 drop to both eyes 2 (two) times a day, Disp: , Rfl:     pantoprazole (PROTONIX) 40 mg tablet, TAKE 1 TABLET BY MOUTH BEFORE BREAKFAST, Disp: , Rfl: 2    polyethylene glycol-propylene glycol (SYSTANE) 0 4-0 3 %, Administer 1 drop to both eyes every 3 (three) hours as needed , Disp: , Rfl:     sertraline (ZOLOFT) 100 mg tablet, Take 100 mg by mouth daily, Disp: , Rfl:     sotalol (BETAPACE) 80 mg tablet, TAKE 1 TABLET (80 MG TOTAL) BY MOUTH 2 (TWO) TIMES A DAY, Disp: 180 tablet, Rfl: 2    tiZANidine (ZANAFLEX) 4 mg tablet, Take 4 mg by mouth every 8 (eight) hours as needed , Disp: , Rfl: 3    traMADol (ULTRAM-ER) 100 mg 24 hr tablet, Take 100 mg by mouth daily , Disp: , Rfl:     traZODone (DESYREL) 150 mg tablet, Take 150 mg by mouth daily at bedtime, Disp: , Rfl:     VENTOLIN  (90 Base) MCG/ACT inhaler, 2 puffs every 4 (four) hours as needed , Disp: , Rfl:

## 2020-10-01 ENCOUNTER — OFFICE VISIT (OUTPATIENT)
Dept: CARDIOLOGY CLINIC | Facility: CLINIC | Age: 60
End: 2020-10-01
Payer: COMMERCIAL

## 2020-10-01 VITALS
SYSTOLIC BLOOD PRESSURE: 130 MMHG | BODY MASS INDEX: 34.31 KG/M2 | DIASTOLIC BLOOD PRESSURE: 84 MMHG | HEIGHT: 64 IN | HEART RATE: 73 BPM | WEIGHT: 201 LBS

## 2020-10-01 DIAGNOSIS — I48.0 PAROXYSMAL ATRIAL FIBRILLATION (HCC): ICD-10-CM

## 2020-10-01 DIAGNOSIS — Z95.0 HISTORY OF PERMANENT CARDIAC PACEMAKER PLACEMENT: ICD-10-CM

## 2020-10-01 DIAGNOSIS — I10 HYPERTENSION, UNSPECIFIED TYPE: ICD-10-CM

## 2020-10-01 DIAGNOSIS — I48.0 PAROXYSMAL ATRIAL FIBRILLATION (HCC): Primary | ICD-10-CM

## 2020-10-01 DIAGNOSIS — I48.91 ATRIAL FIBRILLATION, UNSPECIFIED TYPE (HCC): ICD-10-CM

## 2020-10-01 DIAGNOSIS — E78.5 DYSLIPIDEMIA: ICD-10-CM

## 2020-10-01 PROCEDURE — 93000 ELECTROCARDIOGRAM COMPLETE: CPT | Performed by: INTERNAL MEDICINE

## 2020-10-01 PROCEDURE — 99214 OFFICE O/P EST MOD 30 MIN: CPT | Performed by: INTERNAL MEDICINE

## 2020-10-01 RX ORDER — LISINOPRIL AND HYDROCHLOROTHIAZIDE 25; 20 MG/1; MG/1
1 TABLET ORAL DAILY
Qty: 90 TABLET | Refills: 3 | Status: SHIPPED | OUTPATIENT
Start: 2020-10-01 | End: 2021-09-20

## 2020-10-02 RX ORDER — SOTALOL HYDROCHLORIDE 80 MG/1
TABLET ORAL
Qty: 180 TABLET | Refills: 2 | Status: SHIPPED | OUTPATIENT
Start: 2020-10-02 | End: 2021-06-28

## 2020-12-09 DIAGNOSIS — I48.0 PAROXYSMAL ATRIAL FIBRILLATION (HCC): ICD-10-CM

## 2020-12-23 ENCOUNTER — REMOTE DEVICE CLINIC VISIT (OUTPATIENT)
Dept: CARDIOLOGY CLINIC | Facility: CLINIC | Age: 60
End: 2020-12-23
Payer: COMMERCIAL

## 2020-12-23 DIAGNOSIS — Z95.0 PRESENCE OF PERMANENT CARDIAC PACEMAKER: Primary | ICD-10-CM

## 2020-12-23 PROCEDURE — 93294 REM INTERROG EVL PM/LDLS PM: CPT | Performed by: INTERNAL MEDICINE

## 2020-12-23 PROCEDURE — 93296 REM INTERROG EVL PM/IDS: CPT | Performed by: INTERNAL MEDICINE

## 2020-12-30 ENCOUNTER — TELEPHONE (OUTPATIENT)
Dept: CARDIOLOGY CLINIC | Facility: CLINIC | Age: 60
End: 2020-12-30

## 2021-01-23 DIAGNOSIS — E78.5 DYSLIPIDEMIA: ICD-10-CM

## 2021-01-27 DIAGNOSIS — E78.5 DYSLIPIDEMIA: ICD-10-CM

## 2021-01-27 RX ORDER — ATORVASTATIN CALCIUM 40 MG/1
40 TABLET, FILM COATED ORAL
Qty: 90 TABLET | Refills: 3 | Status: SHIPPED | OUTPATIENT
Start: 2021-01-27 | End: 2021-12-16

## 2021-01-27 RX ORDER — ATORVASTATIN CALCIUM 40 MG/1
TABLET, FILM COATED ORAL
Qty: 90 TABLET | Refills: 1 | Status: SHIPPED | OUTPATIENT
Start: 2021-01-27 | End: 2021-01-27 | Stop reason: SDUPTHER

## 2021-03-10 DIAGNOSIS — Z23 ENCOUNTER FOR IMMUNIZATION: ICD-10-CM

## 2021-03-24 ENCOUNTER — REMOTE DEVICE CLINIC VISIT (OUTPATIENT)
Dept: CARDIOLOGY CLINIC | Facility: CLINIC | Age: 61
End: 2021-03-24
Payer: COMMERCIAL

## 2021-03-24 DIAGNOSIS — Z95.0 CARDIAC PACEMAKER IN SITU: Primary | ICD-10-CM

## 2021-03-24 PROCEDURE — 93294 REM INTERROG EVL PM/LDLS PM: CPT | Performed by: INTERNAL MEDICINE

## 2021-03-24 PROCEDURE — 93296 REM INTERROG EVL PM/IDS: CPT | Performed by: INTERNAL MEDICINE

## 2021-03-24 NOTE — PROGRESS NOTES
Results for orders placed or performed in visit on 03/24/21   Cardiac EP device report    Narrative    MDT-DUAL CHAMBER PPM (AAIR-DDDR MODE)ACTIVE SYSTEM IS MRI CONDITIONAL  CARELINK TRANSMISSION: BATTERY VOLTAGE ADEQUATE (6 5 YRS)  AP-98%, <0 1%  ALL AVAILABLE LEAD PARAMETERS WITHIN NORMAL LIMITS  NO SIGNIFICANT HIGH RATE EPISODES  NORMAL DEVICE FUNCTION   GV

## 2021-06-03 ENCOUNTER — OFFICE VISIT (OUTPATIENT)
Dept: CARDIOLOGY CLINIC | Facility: CLINIC | Age: 61
End: 2021-06-03
Payer: COMMERCIAL

## 2021-06-03 VITALS
BODY MASS INDEX: 35.51 KG/M2 | SYSTOLIC BLOOD PRESSURE: 134 MMHG | OXYGEN SATURATION: 97 % | HEART RATE: 67 BPM | HEIGHT: 64 IN | WEIGHT: 208 LBS | DIASTOLIC BLOOD PRESSURE: 76 MMHG

## 2021-06-03 DIAGNOSIS — I48.0 PAROXYSMAL ATRIAL FIBRILLATION (HCC): ICD-10-CM

## 2021-06-03 DIAGNOSIS — I10 HYPERTENSION, UNSPECIFIED TYPE: ICD-10-CM

## 2021-06-03 DIAGNOSIS — E78.5 DYSLIPIDEMIA: ICD-10-CM

## 2021-06-03 DIAGNOSIS — R00.1 SYMPTOMATIC BRADYCARDIA: ICD-10-CM

## 2021-06-03 DIAGNOSIS — Z95.0 HISTORY OF PERMANENT CARDIAC PACEMAKER PLACEMENT: ICD-10-CM

## 2021-06-03 DIAGNOSIS — R06.00 DOE (DYSPNEA ON EXERTION): Primary | ICD-10-CM

## 2021-06-03 PROBLEM — R06.09 DOE (DYSPNEA ON EXERTION): Status: ACTIVE | Noted: 2021-06-03

## 2021-06-03 PROCEDURE — 99214 OFFICE O/P EST MOD 30 MIN: CPT | Performed by: INTERNAL MEDICINE

## 2021-06-03 PROCEDURE — 93000 ELECTROCARDIOGRAM COMPLETE: CPT | Performed by: INTERNAL MEDICINE

## 2021-06-15 ENCOUNTER — HOSPITAL ENCOUNTER (OUTPATIENT)
Dept: NON INVASIVE DIAGNOSTICS | Facility: CLINIC | Age: 61
Discharge: HOME | End: 2021-06-15

## 2021-06-15 DIAGNOSIS — R06.00 DOE (DYSPNEA ON EXERTION): ICD-10-CM

## 2021-06-16 NOTE — TELEPHONE ENCOUNTER
This pt was unable to have the NM stress test done  She is claustrophobic  Can you prescribe med to she can have it done?       Please advise

## 2021-06-17 DIAGNOSIS — F40.240 CLAUSTROPHOBIA: Primary | ICD-10-CM

## 2021-06-17 RX ORDER — ALPRAZOLAM 0.5 MG/1
0.5 TABLET ORAL ONCE
Qty: 2 TABLET | Refills: 0 | Status: SHIPPED | OUTPATIENT
Start: 2021-06-17 | End: 2021-06-17

## 2021-06-18 NOTE — TELEPHONE ENCOUNTER
Spoke with pt  Per pt she took xanax 0 5 mg 1 hr prior to test  Pt states xanax won't help   Pt had also tried valium in the past for an MRI and she couldn't do test

## 2021-06-21 NOTE — TELEPHONE ENCOUNTER
Xanax  or Valium or Ativan are the only ones,    I can prescribe her Ativan  1 mg -  1 dose  prior to rest and 1 dose prior to stress if that would help

## 2021-06-23 ENCOUNTER — REMOTE DEVICE CLINIC VISIT (OUTPATIENT)
Dept: CARDIOLOGY CLINIC | Facility: CLINIC | Age: 61
End: 2021-06-23
Payer: COMMERCIAL

## 2021-06-23 DIAGNOSIS — Z95.0 PRESENCE OF PERMANENT CARDIAC PACEMAKER: Primary | ICD-10-CM

## 2021-06-23 PROCEDURE — 93294 REM INTERROG EVL PM/LDLS PM: CPT | Performed by: INTERNAL MEDICINE

## 2021-06-23 PROCEDURE — 93296 REM INTERROG EVL PM/IDS: CPT | Performed by: INTERNAL MEDICINE

## 2021-06-28 DIAGNOSIS — I48.0 PAROXYSMAL ATRIAL FIBRILLATION (HCC): ICD-10-CM

## 2021-06-28 RX ORDER — SOTALOL HYDROCHLORIDE 80 MG/1
TABLET ORAL
Qty: 180 TABLET | Refills: 2 | Status: SHIPPED | OUTPATIENT
Start: 2021-06-28 | End: 2022-03-01

## 2021-07-06 DIAGNOSIS — F40.240 CLAUSTROPHOBIA: Primary | ICD-10-CM

## 2021-07-07 RX ORDER — LORAZEPAM 1 MG/1
1 TABLET ORAL
Qty: 2 TABLET | Refills: 0 | Status: SHIPPED | OUTPATIENT
Start: 2021-07-07 | End: 2021-11-23 | Stop reason: ALTCHOICE

## 2021-07-13 DIAGNOSIS — R06.00 DOE (DYSPNEA ON EXERTION): Primary | ICD-10-CM

## 2021-07-13 NOTE — PROGRESS NOTES
attempted to perform a pharmacologic stress test due to knee and ankle osteoarthritis precluding exercise stress testing  Due to claustrophobia/ anxiety, anxiolytics were prescribed -Ativan/ Valium/ Xanax- patient still feels she would not be able to do it  Will check a dobutamine stress echo

## 2021-07-20 ENCOUNTER — HOSPITAL ENCOUNTER (OUTPATIENT)
Dept: NON INVASIVE DIAGNOSTICS | Facility: HOSPITAL | Age: 61
Discharge: HOME | End: 2021-07-20
Payer: COMMERCIAL

## 2021-07-20 DIAGNOSIS — R06.00 DOE (DYSPNEA ON EXERTION): ICD-10-CM

## 2021-07-20 LAB
CHEST PAIN STATEMENT: NORMAL
MAX DIASTOLIC BP: 105 MMHG
MAX HEART RATE: 130 BPM
MAX PREDICTED HEART RATE: 159 BPM
MAX. SYSTOLIC BP: 225 MMHG
PROTOCOL NAME: NORMAL
REASON FOR TERMINATION: NORMAL
TARGET HR FORMULA: NORMAL
TEST INDICATION: NORMAL
TIME IN EXERCISE PHASE: NORMAL

## 2021-07-20 PROCEDURE — 93350 STRESS TTE ONLY: CPT

## 2021-07-20 PROCEDURE — 93351 STRESS TTE COMPLETE: CPT | Performed by: INTERNAL MEDICINE

## 2021-07-20 RX ORDER — DOBUTAMINE HYDROCHLORIDE 200 MG/100ML
5 INJECTION INTRAVENOUS CONTINUOUS
Status: DISCONTINUED | OUTPATIENT
Start: 2021-07-20 | End: 2021-07-21 | Stop reason: HOSPADM

## 2021-07-20 RX ORDER — METOPROLOL TARTRATE 5 MG/5ML
5 INJECTION INTRAVENOUS ONCE
Status: COMPLETED | OUTPATIENT
Start: 2021-07-20 | End: 2021-07-20

## 2021-07-20 RX ADMIN — METOROPROLOL TARTRATE 5 MG: 5 INJECTION, SOLUTION INTRAVENOUS at 09:41

## 2021-07-20 RX ADMIN — DOBUTAMINE HYDROCHLORIDE 5 MCG/KG/MIN: 200 INJECTION INTRAVENOUS at 09:24

## 2021-08-03 ENCOUNTER — REMOTE DEVICE CLINIC VISIT (OUTPATIENT)
Dept: CARDIOLOGY CLINIC | Facility: CLINIC | Age: 61
End: 2021-08-03

## 2021-08-03 DIAGNOSIS — Z95.0 PRESENCE OF PERMANENT CARDIAC PACEMAKER: Primary | ICD-10-CM

## 2021-08-03 PROCEDURE — RECHECK

## 2021-09-16 ENCOUNTER — IN-CLINIC DEVICE VISIT (OUTPATIENT)
Dept: CARDIOLOGY CLINIC | Facility: CLINIC | Age: 61
End: 2021-09-16
Payer: COMMERCIAL

## 2021-09-16 DIAGNOSIS — Z95.0 PACEMAKER: Primary | ICD-10-CM

## 2021-09-16 PROCEDURE — 93280 PM DEVICE PROGR EVAL DUAL: CPT | Performed by: INTERNAL MEDICINE

## 2021-09-16 NOTE — PROGRESS NOTES
Results for orders placed or performed in visit on 09/16/21   Cardiac EP device report    Narrative    MDT-DUAL CHAMBER PPM (AAIR-DDDR MODE)/ACTIVE SYSTEM IS MRI CONDITIONAL  DEVICE INTERROGATED IN THE [place] OFFICE/YEARLY  BATTERY ADEQUATE (4-5 5 YRS)  AP 99%;  0% (SSS/AAIR-DDDR 60)  ALL LEAD PARAMETERS WITHIN NORMAL LIMITS  NO EPISODES  NO PROGRAMMING CHANGES MADE TO DEVICE PARAMETERS  NORMAL DEVICE FUNCTION   PL

## 2021-09-18 DIAGNOSIS — I10 HYPERTENSION, UNSPECIFIED TYPE: ICD-10-CM

## 2021-09-20 RX ORDER — LISINOPRIL AND HYDROCHLOROTHIAZIDE 25; 20 MG/1; MG/1
1 TABLET ORAL DAILY
Qty: 90 TABLET | Refills: 3 | Status: SHIPPED | OUTPATIENT
Start: 2021-09-20

## 2021-10-30 DIAGNOSIS — I48.0 PAROXYSMAL ATRIAL FIBRILLATION (HCC): ICD-10-CM

## 2021-11-01 RX ORDER — APIXABAN 5 MG/1
TABLET, FILM COATED ORAL
Qty: 180 TABLET | Refills: 3 | Status: SHIPPED | OUTPATIENT
Start: 2021-11-01 | End: 2022-04-07 | Stop reason: SDUPTHER

## 2021-11-23 ENCOUNTER — OFFICE VISIT (OUTPATIENT)
Dept: CARDIOLOGY CLINIC | Facility: CLINIC | Age: 61
End: 2021-11-23
Payer: COMMERCIAL

## 2021-11-23 VITALS
HEART RATE: 69 BPM | OXYGEN SATURATION: 96 % | HEIGHT: 64 IN | BODY MASS INDEX: 35.58 KG/M2 | DIASTOLIC BLOOD PRESSURE: 82 MMHG | WEIGHT: 208.4 LBS | SYSTOLIC BLOOD PRESSURE: 142 MMHG

## 2021-11-23 DIAGNOSIS — Z95.0 HISTORY OF PERMANENT CARDIAC PACEMAKER PLACEMENT: ICD-10-CM

## 2021-11-23 DIAGNOSIS — I48.91 ATRIAL FIBRILLATION, UNSPECIFIED TYPE (HCC): ICD-10-CM

## 2021-11-23 DIAGNOSIS — I48.0 PAROXYSMAL ATRIAL FIBRILLATION (HCC): Primary | ICD-10-CM

## 2021-11-23 DIAGNOSIS — E78.5 DYSLIPIDEMIA: ICD-10-CM

## 2021-11-23 DIAGNOSIS — R06.00 DOE (DYSPNEA ON EXERTION): ICD-10-CM

## 2021-11-23 DIAGNOSIS — I10 HYPERTENSION, UNSPECIFIED TYPE: ICD-10-CM

## 2021-11-23 PROCEDURE — 99214 OFFICE O/P EST MOD 30 MIN: CPT | Performed by: INTERNAL MEDICINE

## 2021-11-23 PROCEDURE — 93000 ELECTROCARDIOGRAM COMPLETE: CPT | Performed by: INTERNAL MEDICINE

## 2021-12-15 DIAGNOSIS — E78.5 DYSLIPIDEMIA: ICD-10-CM

## 2021-12-16 RX ORDER — ATORVASTATIN CALCIUM 40 MG/1
TABLET, FILM COATED ORAL
Qty: 90 TABLET | Refills: 3 | Status: SHIPPED | OUTPATIENT
Start: 2021-12-16

## 2021-12-17 ENCOUNTER — REMOTE DEVICE CLINIC VISIT (OUTPATIENT)
Dept: CARDIOLOGY CLINIC | Facility: CLINIC | Age: 61
End: 2021-12-17
Payer: COMMERCIAL

## 2021-12-17 DIAGNOSIS — Z95.0 PRESENCE OF PERMANENT CARDIAC PACEMAKER: Primary | ICD-10-CM

## 2021-12-17 PROCEDURE — 93294 REM INTERROG EVL PM/LDLS PM: CPT | Performed by: INTERNAL MEDICINE

## 2021-12-17 PROCEDURE — 93296 REM INTERROG EVL PM/IDS: CPT | Performed by: INTERNAL MEDICINE

## 2022-02-27 DIAGNOSIS — I48.0 PAROXYSMAL ATRIAL FIBRILLATION (HCC): ICD-10-CM

## 2022-03-01 RX ORDER — SOTALOL HYDROCHLORIDE 80 MG/1
TABLET ORAL
Qty: 180 TABLET | Refills: 2 | Status: SHIPPED | OUTPATIENT
Start: 2022-03-01

## 2022-03-11 ENCOUNTER — TELEPHONE (OUTPATIENT)
Dept: CARDIOLOGY CLINIC | Facility: CLINIC | Age: 62
End: 2022-03-11

## 2022-03-11 DIAGNOSIS — I10 HYPERTENSION, UNSPECIFIED TYPE: Primary | ICD-10-CM

## 2022-03-11 RX ORDER — AMLODIPINE BESYLATE 5 MG/1
5 TABLET ORAL DAILY
Qty: 90 TABLET | Refills: 3 | Status: SHIPPED | OUTPATIENT
Start: 2022-03-11

## 2022-03-11 NOTE — TELEPHONE ENCOUNTER
----- Message from Paulette Grubbs MD sent at 3/11/2022  9:39 AM EST -----  Regarding: FW: Blood pressure   Added amlodipine, pl let pt know    BPs are consistently elevated  ----- Message -----  From: Stu Barragan MA  Sent: 3/9/2022   3:06 PM EST  To: Paulette Grubbs MD  Subject: Blood pressure                                   Please Advise    ----- Message -----  From: Cassidy Bui  Sent: 3/9/2022   3:03 PM EST  To: [facility]  Subject: Blood pressure                                   Hi, I bought an arm cuff blood pressure and my blood pressure seems to be high  My average pressure is 167/94 heart rate 71 heres a few measurements   173/92, 172/98, 156/91, 162/91, 154/88, 171/90, 142/96, 158/83, 161/100, 179/101, 163/88, 121/81,   Getting slight headaches only on the left side of my head  Anything to worry about    Thanks,   [employer] [phone]

## 2022-03-18 ENCOUNTER — REMOTE DEVICE CLINIC VISIT (OUTPATIENT)
Dept: CARDIOLOGY CLINIC | Facility: CLINIC | Age: 62
End: 2022-03-18
Payer: COMMERCIAL

## 2022-03-18 DIAGNOSIS — Z95.0 PRESENCE OF CARDIAC PACEMAKER: Primary | ICD-10-CM

## 2022-03-18 PROCEDURE — 93294 REM INTERROG EVL PM/LDLS PM: CPT | Performed by: INTERNAL MEDICINE

## 2022-03-18 PROCEDURE — 93296 REM INTERROG EVL PM/IDS: CPT | Performed by: INTERNAL MEDICINE

## 2022-03-18 NOTE — PROGRESS NOTES
Results for orders placed or performed in visit on 03/18/22   Cardiac EP device report    Narrative    MDT-DUAL CHAMBER PPM (AAIR-DDDR MODE)/ACTIVE SYSTEM IS MRI CONDITIONAL  CARELINK TRANSMISSION: BATTERY VOLTAGE ADEQUATE (5 5 YRS)  AP: 98 9%  : <0 1% (MVP-ON)  ALL AVAILABLE LEAD PARAMETERS WITHIN NORMAL LIMITS  5 VT EPISODES W/ EGMS SHOWING PAT 11 BEATS @ 171 BPM, 8 BEATS @ 162 BPM, 11 BEATS @ 176 BPM, 11 BEATS @ 158 BPM & 10 BEATS @ 182 BPM  PT TAKES SOTALOL, ELIQUIS  EF: 65% (ECHO 7/2021)  PACEMAKER FUNCTIONING APPROPRIATELY    [address] [place] [address] Street

## 2022-04-07 DIAGNOSIS — I48.0 PAROXYSMAL ATRIAL FIBRILLATION (HCC): ICD-10-CM

## 2022-04-08 NOTE — TELEPHONE ENCOUNTER
Requested medication(s) are due for refill today: Yes  Patient has already received a courtesy refill: No  Other reason request has been forwarded to provider:   Hematology:  Anticoagulants Failed 04/07/2022 10:30 PM   Protocol Details  HGB in normal range and within 360 days    PLT in normal range and within 360 days    HCT in normal range and within 360 days    Cr in normal range and within 360 days    Valid encounter within last 6 months

## 2022-05-23 DIAGNOSIS — I48.0 PAROXYSMAL ATRIAL FIBRILLATION (HCC): ICD-10-CM

## 2022-05-25 RX ORDER — APIXABAN 5 MG/1
TABLET, FILM COATED ORAL
Qty: 180 TABLET | Refills: 0 | Status: SHIPPED | OUTPATIENT
Start: 2022-05-25

## 2022-06-17 ENCOUNTER — REMOTE DEVICE CLINIC VISIT (OUTPATIENT)
Dept: CARDIOLOGY CLINIC | Facility: CLINIC | Age: 62
End: 2022-06-17
Payer: COMMERCIAL

## 2022-06-17 DIAGNOSIS — Z95.0 PRESENCE OF PERMANENT CARDIAC PACEMAKER: Primary | ICD-10-CM

## 2022-06-17 PROCEDURE — 93294 REM INTERROG EVL PM/LDLS PM: CPT | Performed by: INTERNAL MEDICINE

## 2022-06-17 PROCEDURE — 93296 REM INTERROG EVL PM/IDS: CPT | Performed by: INTERNAL MEDICINE

## 2022-06-17 NOTE — PROGRESS NOTES
MDT-DUAL CHAMBER PPM (AAIR-DDDR MODE)/ACTIVE SYSTEM IS MRI CONDITIONAL   CARELINK TRANSMISSION:  BATTERY VOLTAGE ADEQUATE (5 YR )   AP 98 2%  <0 1%    ALL LEAD PARAMETERS WITHIN NORMAL LIMITS   2 VT-NS EPISODES WITH EGMS SHOWING PAT (8 @ 176 BPM, 7 @ 194 BPM)   NORMAL DEVICE FUNCTION   RG

## 2022-08-25 DIAGNOSIS — I48.0 PAROXYSMAL ATRIAL FIBRILLATION (HCC): ICD-10-CM

## 2022-08-25 RX ORDER — APIXABAN 5 MG/1
TABLET, FILM COATED ORAL
Qty: 180 TABLET | Refills: 0 | Status: SHIPPED | OUTPATIENT
Start: 2022-08-25

## 2022-08-27 DIAGNOSIS — I10 HYPERTENSION, UNSPECIFIED TYPE: ICD-10-CM

## 2022-08-31 RX ORDER — LISINOPRIL AND HYDROCHLOROTHIAZIDE 25; 20 MG/1; MG/1
1 TABLET ORAL DAILY
Qty: 90 TABLET | Refills: 3 | Status: SHIPPED | OUTPATIENT
Start: 2022-08-31

## 2022-09-01 ENCOUNTER — OFFICE VISIT (OUTPATIENT)
Dept: CARDIOLOGY CLINIC | Facility: CLINIC | Age: 62
End: 2022-09-01
Payer: COMMERCIAL

## 2022-09-01 VITALS
WEIGHT: 213.8 LBS | DIASTOLIC BLOOD PRESSURE: 82 MMHG | BODY MASS INDEX: 36.5 KG/M2 | OXYGEN SATURATION: 97 % | HEIGHT: 64 IN | HEART RATE: 66 BPM | SYSTOLIC BLOOD PRESSURE: 150 MMHG

## 2022-09-01 DIAGNOSIS — Z95.0 HISTORY OF PERMANENT CARDIAC PACEMAKER PLACEMENT: ICD-10-CM

## 2022-09-01 DIAGNOSIS — I48.91 ATRIAL FIBRILLATION, UNSPECIFIED TYPE (HCC): Primary | ICD-10-CM

## 2022-09-01 DIAGNOSIS — I10 HYPERTENSION, UNSPECIFIED TYPE: ICD-10-CM

## 2022-09-01 PROCEDURE — 93000 ELECTROCARDIOGRAM COMPLETE: CPT | Performed by: INTERNAL MEDICINE

## 2022-09-01 PROCEDURE — 99214 OFFICE O/P EST MOD 30 MIN: CPT | Performed by: INTERNAL MEDICINE

## 2022-09-01 RX ORDER — ALPRAZOLAM 0.5 MG/1
TABLET ORAL
COMMUNITY

## 2022-09-01 RX ORDER — TIZANIDINE 4 MG/1
4 TABLET ORAL 3 TIMES DAILY PRN
COMMUNITY
Start: 2022-08-23
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
## 2022-09-15 ENCOUNTER — IN-CLINIC DEVICE VISIT (OUTPATIENT)
Dept: CARDIOLOGY CLINIC | Facility: CLINIC | Age: 62
End: 2022-09-15
Payer: COMMERCIAL

## 2022-09-15 DIAGNOSIS — Z95.0 CARDIAC PACEMAKER IN SITU: Primary | ICD-10-CM

## 2022-09-15 PROCEDURE — 93280 PM DEVICE PROGR EVAL DUAL: CPT | Performed by: INTERNAL MEDICINE

## 2022-09-15 NOTE — PROGRESS NOTES
Results for orders placed or performed in visit on 09/15/22   Cardiac EP device report    Narrative    MDT-DUAL CHAMBER PPM (AAIR-DDDR MODE)/ACTIVE SYSTEM IS MRI CONDITIONAL  DEVICE INTERROGATED IN THE [place] OFFICE  BATTERY VOLTAGE ADEQUATE (5 YRS)  AP-98%, <0 1%  ALL LEAD PARAMETERS WITHIN NORMAL LIMITS  NO NEW SIGNIFICANT HIGH RATE EPISODES  NORMAL DEVICE FUNCTION   GV

## 2022-09-25 ENCOUNTER — PATIENT MESSAGE (OUTPATIENT)
Dept: CARDIOLOGY CLINIC | Facility: CLINIC | Age: 62
End: 2022-09-25

## 2022-11-17 DIAGNOSIS — E78.5 DYSLIPIDEMIA: ICD-10-CM

## 2022-11-17 DIAGNOSIS — I48.0 PAROXYSMAL ATRIAL FIBRILLATION (HCC): ICD-10-CM

## 2022-11-17 RX ORDER — ATORVASTATIN CALCIUM 40 MG/1
TABLET, FILM COATED ORAL
Qty: 90 TABLET | Refills: 3 | Status: SHIPPED | OUTPATIENT
Start: 2022-11-17

## 2022-11-17 RX ORDER — SOTALOL HYDROCHLORIDE 80 MG/1
TABLET ORAL
Qty: 180 TABLET | Refills: 2 | Status: SHIPPED | OUTPATIENT
Start: 2022-11-17

## 2022-12-29 ENCOUNTER — REMOTE DEVICE CLINIC VISIT (OUTPATIENT)
Dept: CARDIOLOGY CLINIC | Facility: CLINIC | Age: 62
End: 2022-12-29

## 2022-12-29 DIAGNOSIS — Z95.0 PRESENCE OF CARDIAC PACEMAKER: Primary | ICD-10-CM

## 2022-12-30 NOTE — PROGRESS NOTES
Results for orders placed or performed in visit on 12/29/22   Cardiac EP device report    Narrative    MDT-DUAL CHAMBER PPM (AAIR-DDDR MODE)/ACTIVE SYSTEM IS MRI CONDITIONAL  CARELINK TRANSMISSION: BATTERY VOLTAGE ADEQUATE (4 5 YRS)  AP; 97 7%  : <0 1% (MVP-ON)  AP: 97 7%  : <0 1% (MVP-ON)  ALL AVAILABLE LEAD PARAMETERS WITHIN NORMAL LIMITS  1 FAST A&V EPISODE W/ EGM SHOWING SVT-ST @ 182 BPM, DURATION 7 SECS  PT TAKES SOTALOL, ELIQUIS  EF: 65% (echo 7/20/21)  PACEMAKER FUNCTIONING APPROPRIATELY    [address] [place] [address] Street

## 2022-12-31 DIAGNOSIS — I48.0 PAROXYSMAL ATRIAL FIBRILLATION (HCC): ICD-10-CM

## 2023-01-04 RX ORDER — APIXABAN 5 MG/1
TABLET, FILM COATED ORAL
Qty: 180 TABLET | Refills: 0 | Status: SHIPPED | OUTPATIENT
Start: 2023-01-04

## 2023-01-16 DIAGNOSIS — I48.0 PAROXYSMAL ATRIAL FIBRILLATION (HCC): ICD-10-CM

## 2023-01-19 RX ORDER — APIXABAN 5 MG/1
TABLET, FILM COATED ORAL
Qty: 180 TABLET | Refills: 0 | Status: SHIPPED | OUTPATIENT
Start: 2023-01-19 | End: 2023-01-19 | Stop reason: SDUPTHER

## 2023-01-19 RX ORDER — APIXABAN 5 MG/1
TABLET, FILM COATED ORAL
Qty: 180 TABLET | Refills: 0 | Status: SHIPPED | OUTPATIENT
Start: 2023-01-19

## 2023-02-09 DIAGNOSIS — I10 HYPERTENSION, UNSPECIFIED TYPE: ICD-10-CM

## 2023-02-09 RX ORDER — AMLODIPINE BESYLATE 5 MG/1
5 TABLET ORAL DAILY
Qty: 90 TABLET | Refills: 3 | Status: SHIPPED | OUTPATIENT
Start: 2023-02-09

## 2023-03-19 NOTE — PROGRESS NOTES
MDT-DUAL CHAMBER PPM (AAIR-DDDR MODE)/ACTIVE SYSTEM IS MRI CONDITIONAL   CARELINK TRANSMISSION:  BATTERY VOLTAGE ADEQUATE (6 5 YR)   AP 98 1%  <0 1%    ALL LEAD PARAMETERS WITHIN NORMAL LIMITS   3 VT EPISODES WITH 1 EGM SHOWING NSVT (#24-9 @ 158 BPM), AND 2 EGMS SHOWING PAT (#23-15 @ 164 BPM, #25-11 @ 157 BPM)   EF 68% (NST 01/2017)   PT TAKES ELIQUIS AND SOTALOL   TASK TO DR JOVI Bullock Gains DEVICE FUNCTION   RG
(2) Male

## 2023-03-30 ENCOUNTER — REMOTE DEVICE CLINIC VISIT (OUTPATIENT)
Dept: CARDIOLOGY CLINIC | Facility: CLINIC | Age: 63
End: 2023-03-30

## 2023-03-30 DIAGNOSIS — Z95.0 PRESENCE OF PERMANENT CARDIAC PACEMAKER: Primary | ICD-10-CM

## 2023-03-31 NOTE — PROGRESS NOTES
"Results for orders placed or performed in visit on 03/30/23   Cardiac EP device report    Narrative    MDT-DUAL CHAMBER PPM (AAIR-DDDR MODE)/ACTIVE SYSTEM IS MRI CONDITIONAL  CARELINK TRANSMISSION: BATTERY STATUS \"4 YRS  \" AP 97%  0%  ALL AVAILABLE LEAD PARAMETERS WITHIN NORMAL LIMITS  2 VHRS NOTED; # 36 SHOWING NSVT APPROX 8 BEATS@ 158 BPM  OTHER EGRAM PRESENTS AS SVT  PT ON SOTALOL & ELIQUIS  EF 65% (ECHO/STS 2021)  NORMAL DEVICE FUNCTION   NC       "

## 2023-05-09 ENCOUNTER — APPOINTMENT (OUTPATIENT)
Dept: RADIOLOGY | Age: 63
End: 2023-05-09

## 2023-05-09 ENCOUNTER — OFFICE VISIT (OUTPATIENT)
Dept: PODIATRY | Facility: CLINIC | Age: 63
End: 2023-05-09

## 2023-05-09 VITALS
HEART RATE: 90 BPM | SYSTOLIC BLOOD PRESSURE: 168 MMHG | HEIGHT: 64 IN | BODY MASS INDEX: 38.93 KG/M2 | DIASTOLIC BLOOD PRESSURE: 108 MMHG | WEIGHT: 228 LBS

## 2023-05-09 DIAGNOSIS — M25.775 OSTEOPHYTE, LEFT FOOT: ICD-10-CM

## 2023-05-09 DIAGNOSIS — M19.072 PRIMARY OSTEOARTHRITIS OF LEFT FOOT: Primary | ICD-10-CM

## 2023-05-09 DIAGNOSIS — M25.572 PAIN IN JOINT OF LEFT FOOT: ICD-10-CM

## 2023-05-09 RX ORDER — METHYLPREDNISOLONE 4 MG/1
TABLET ORAL
COMMUNITY
Start: 2023-02-21

## 2023-05-09 RX ORDER — LIDOCAINE HYDROCHLORIDE 10 MG/ML
2 INJECTION, SOLUTION INFILTRATION; PERINEURAL
Status: SHIPPED | OUTPATIENT
Start: 2023-05-09

## 2023-05-09 RX ORDER — TRIAMCINOLONE ACETONIDE 40 MG/ML
20 INJECTION, SUSPENSION INTRA-ARTICULAR; INTRAMUSCULAR
Status: SHIPPED | OUTPATIENT
Start: 2023-05-09

## 2023-05-09 RX ORDER — PANTOPRAZOLE SODIUM 40 MG/1
TABLET, DELAYED RELEASE ORAL
COMMUNITY
Start: 2023-04-24

## 2023-05-09 RX ORDER — MIRTAZAPINE 7.5 MG/1
TABLET, FILM COATED ORAL
COMMUNITY
Start: 2023-03-27

## 2023-05-09 RX ORDER — DIAZEPAM 5 MG/1
TABLET ORAL
COMMUNITY
Start: 2023-02-23

## 2023-05-09 RX ORDER — SERTRALINE HYDROCHLORIDE 25 MG/1
TABLET, FILM COATED ORAL
COMMUNITY
Start: 2023-03-02

## 2023-05-09 RX ADMIN — TRIAMCINOLONE ACETONIDE 20 MG: 40 INJECTION, SUSPENSION INTRA-ARTICULAR; INTRAMUSCULAR at 12:23

## 2023-05-09 RX ADMIN — LIDOCAINE HYDROCHLORIDE 2 ML: 10 INJECTION, SOLUTION INFILTRATION; PERINEURAL at 12:23

## 2023-05-09 NOTE — PROGRESS NOTES
"               PATIENT:  Michael Moser  1960       ASSESSMENT:     1  Primary osteoarthritis of left foot  Small joint arthrocentesis: L subtalar      2  Osteophyte, left foot  XR foot 3+ vw left      3  Pain in joint of left foot                  PLAN:  1  Patient was counseled and educated on the condition and the diagnosis  2  Previous left foot X-rays were personally reviewed  The radiological findings were discussed with the patient  3  The diagnosis, treatment options and prognosis were discussed with the patient  4  She has STJ and midtarsal joint arthrosis with osteophyte  Will try an injection to left STJ  See procedure  Resting and icing  CAM walker or brace for 2 weeks  5  Sent her for X-ray of left foot  6  Discussed possible further injection and surgical option  7  Patient will return in 6 weeks for re-evaluation  Imaging: I have personally reviewed pertinent films in PACS  Labs, pathology, and Other Studies: I have personally reviewed pertinent reports  Small joint arthrocentesis: L subtalar  Universal Protocol:  Consent: Verbal consent obtained  Consent given by: patient  Time out: Immediately prior to procedure a \"time out\" was called to verify the correct patient, procedure, equipment, support staff and site/side marked as required    Timeout called at: 5/9/2023 11:52 AM   Patient understanding: patient states understanding of the procedure being performed  Site marked: the operative site was marked  Patient identity confirmed: verbally with patient    Supporting Documentation  Indications: pain   Procedure Details  Location: foot - L subtalar  Needle size: 25 G  Ultrasound guidance: no  Approach: lateral  Medications administered: 20 mg triamcinolone acetonide 40 mg/mL; 2 mL lidocaine 1 %    Patient tolerance: patient tolerated the procedure well with no immediate complications            Subjective:       HPI  The patient presents with chief complaint of left " foot/ ankle pain  Increased pain in the past year  She has a history of TMTJ fusion  She also had nerve surgery for impingement after the fusion  No increased swelling in her left foot  She has post-static dyskinesia  Increased pain with walking and standing  Her ankle gives out when she has pain  No significant weakness or dysfunction  The following portions of the patient's history were reviewed and updated as appropriate: allergies, current medications, past family history, past medical history, past social history, past surgical history and problem list   All pertinent labs and images were reviewed        Past Medical History  Past Medical History:   Diagnosis Date   • Arthritis    • Asthma    • Atrial fibrillation ([facility] [address] )    • Nondisplaced fracture of distal phalanx of left great toe    • Osteoarthritis    • Other specific joint derangements of right foot, not elsewhere classified        Past Surgical History  Past Surgical History:   Procedure Laterality Date   • BREAST LUMPECTOMY     • CHOLECYSTECTOMY  1982   • FOOT SURGERY  2009   • HYSTERECTOMY  2000   • INSERT / REPLACE / REMOVE PACEMAKER     • MOUTH SURGERY          Allergies:  Lomotil [diphenoxylate]    Medications:  Current Outpatient Medications   Medication Sig Dispense Refill   • amLODIPine (NORVASC) 5 mg tablet Take 1 tablet (5 mg total) by mouth daily 90 tablet 3   • ALPRAZolam (XANAX) 0 5 mg tablet Take by mouth daily at bedtime as needed for anxiety     • atorvastatin (LIPITOR) 40 mg tablet TAKE 1 TABLET BY MOUTH AT BEDTIME 90 tablet 3   • bimatoprost (LUMIGAN) 0 01 % ophthalmic drops Administer 1 drop to both eyes Daily      • cycloSPORINE (RESTASIS) 0 05 % ophthalmic emulsion Administer 1 drop to both eyes every 12 (twelve) hours     • diazepam (VALIUM) 5 mg tablet      • dorzolamide-timolol (COSOPT) 2-0 5 % ophthalmic solution Administer 1 drop to both eyes 2 (two) times a day     • Eliquis 5 MG TAKE 1 TABLET BY MOUTH TWICE DAILY 180 tablet 0   • lisinopril-hydrochlorothiazide (PRINZIDE,ZESTORETIC) 20-25 MG per tablet TAKE 1 TABLET BY MOUTH DAILY 90 tablet 3   • methylPREDNISolone 4 MG tablet therapy pack Follow package directions     • mirtazapine (REMERON) 7 5 MG tablet      • Netarsudil Dimesylate 0 02 % SOLN Apply 1 drop to eye daily at bedtime     • olopatadine HCl (PATADAY) 0 2 % opth drops Administer 1 drop to both eyes 2 (two) times a day     • pantoprazole (PROTONIX) 40 mg tablet TAKE 1 TABLET BY MOUTH BEFORE BREAKFAST     • polyethylene glycol-propylene glycol (SYSTANE) 0 4-0 3 % Administer 1 drop to both eyes every 3 (three) hours as needed      • sertraline (ZOLOFT) 100 mg tablet Take 100 mg by mouth daily     • sertraline (ZOLOFT) 25 mg tablet TAKE 1 TABLET BY MOUTH DAILY WITH 100MG     • sotalol (BETAPACE) 80 mg tablet TAKE 1 TABLET BY MOUTH TWICE DAILY 180 tablet 2   • tiZANidine (ZANAFLEX) 4 mg tablet Take 4 mg by mouth 3 (three) times a day as needed     • VENTOLIN  (90 Base) MCG/ACT inhaler 2 puffs every 4 (four) hours as needed  (Patient not taking: Reported on 2021)       No current facility-administered medications for this visit         Social History:  Social History     Socioeconomic History   • Marital status: /Civil Union     Spouse name: None   • Number of children: None   • Years of education: None   • Highest education level: None   Occupational History   • None   Tobacco Use   • Smoking status: Former     Packs/day: 0 00     Years: 15 00     Pack years: 0 00     Types: Cigarettes     Quit date: 2009     Years since quittin 0   • Smokeless tobacco: Never   Substance and Sexual Activity   • Alcohol use: No   • Drug use: No   • Sexual activity: Not Currently     Partners: Male     Birth control/protection: Surgical   Other Topics Concern   • None   Social History Narrative   • None     Social Determinants of Health     Financial Resource Strain: Not on file   Food Insecurity: Not on file "  Transportation Needs: Not on file   Physical Activity: Not on file   Stress: Not on file   Social Connections: Not on file   Intimate Partner Violence: Not on file   Housing Stability: Not on file          Review of Systems   Constitutional: Negative for chills and fever  Respiratory: Negative for cough and shortness of breath  Cardiovascular: Negative for chest pain  Gastrointestinal: Negative for nausea and vomiting  Musculoskeletal: Positive for arthralgias  Skin: Negative for wound  Allergic/Immunologic: Negative for immunocompromised state  Neurological: Negative for weakness  Hematological: Negative  Psychiatric/Behavioral: Negative for behavioral problems and confusion  Objective:      BP (!) 168/108   Pulse 90   Ht 5' 4\" (1 626 m)   Wt 103 kg (228 lb)   BMI 39 14 kg/m²          Physical Exam  Vitals reviewed  Constitutional:       General: She is not in acute distress  Appearance: She is obese  She is not toxic-appearing  HENT:      Head: Normocephalic and atraumatic  Eyes:      Extraocular Movements: Extraocular movements intact  Cardiovascular:      Rate and Rhythm: Normal rate and regular rhythm  Pulses: No decreased pulses  Dorsalis pedis pulses are 2+ on the right side and 1+ on the left side  Posterior tibial pulses are 1+ on the right side and 1+ on the left side  Pulmonary:      Effort: Pulmonary effort is normal  No respiratory distress  Musculoskeletal:         General: Tenderness present  No signs of injury  Cervical back: Normal range of motion and neck supple  Right lower leg: No edema  Left lower leg: No edema  Right foot: No foot drop  Left foot: No foot drop  Comments: Focal pain around left sinus tarsi with diffuse swelling  Focal pain over left NCJ with hypertrophy / osteophyte  Skin:     General: Skin is warm  Capillary Refill: Capillary refill takes less than 2 seconds        " Coloration: Skin is not cyanotic or mottled  Findings: No abscess, ecchymosis or wound  Nails: There is no clubbing  Neurological:      General: No focal deficit present  Mental Status: She is alert and oriented to person, place, and time  Cranial Nerves: No cranial nerve deficit  Motor: No weakness  Coordination: Coordination normal    Psychiatric:         Mood and Affect: Mood normal          Behavior: Behavior normal          Thought Content:  Thought content normal          Judgment: Judgment normal

## 2023-05-24 ENCOUNTER — TELEPHONE (OUTPATIENT)
Dept: CARDIOLOGY CLINIC | Facility: CLINIC | Age: 63
End: 2023-05-24

## 2023-05-24 NOTE — TELEPHONE ENCOUNTER
Received a fax from [facility] asking if pt may hold Eliquis 2 days prior to colonoscopy scheduled for 6/16/23?

## 2023-06-07 ENCOUNTER — OFFICE VISIT (OUTPATIENT)
Dept: CARDIOLOGY CLINIC | Facility: CLINIC | Age: 63
End: 2023-06-07
Payer: COMMERCIAL

## 2023-06-07 VITALS
WEIGHT: 233 LBS | DIASTOLIC BLOOD PRESSURE: 90 MMHG | BODY MASS INDEX: 39.78 KG/M2 | OXYGEN SATURATION: 99 % | HEIGHT: 64 IN | HEART RATE: 68 BPM | SYSTOLIC BLOOD PRESSURE: 186 MMHG

## 2023-06-07 DIAGNOSIS — I48.0 PAROXYSMAL ATRIAL FIBRILLATION (HCC): Primary | ICD-10-CM

## 2023-06-07 DIAGNOSIS — E78.5 DYSLIPIDEMIA: ICD-10-CM

## 2023-06-07 DIAGNOSIS — I10 HYPERTENSION, UNSPECIFIED TYPE: ICD-10-CM

## 2023-06-07 DIAGNOSIS — Z95.0 HISTORY OF PERMANENT CARDIAC PACEMAKER PLACEMENT: ICD-10-CM

## 2023-06-07 PROCEDURE — 93000 ELECTROCARDIOGRAM COMPLETE: CPT | Performed by: INTERNAL MEDICINE

## 2023-06-07 PROCEDURE — 99214 OFFICE O/P EST MOD 30 MIN: CPT | Performed by: INTERNAL MEDICINE

## 2023-06-07 RX ORDER — AMLODIPINE BESYLATE 10 MG/1
10 TABLET ORAL DAILY
Qty: 90 TABLET | Refills: 3 | Status: SHIPPED | OUTPATIENT
Start: 2023-06-07
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
## 2023-06-16 DIAGNOSIS — I48.0 PAROXYSMAL ATRIAL FIBRILLATION (HCC): ICD-10-CM

## 2023-06-19 RX ORDER — APIXABAN 5 MG/1
TABLET, FILM COATED ORAL
Qty: 180 TABLET | Refills: 0 | Status: SHIPPED | OUTPATIENT
Start: 2023-06-19

## 2023-06-19 NOTE — TELEPHONE ENCOUNTER
Requested medication(s) are due for refill today: Yes  Patient has already received a courtesy refill: No  Other reason request has been forwarded to provider: Sk

## 2023-06-20 ENCOUNTER — OFFICE VISIT (OUTPATIENT)
Dept: PODIATRY | Facility: CLINIC | Age: 63
End: 2023-06-20
Payer: COMMERCIAL

## 2023-06-20 ENCOUNTER — APPOINTMENT (OUTPATIENT)
Dept: RADIOLOGY | Age: 63
End: 2023-06-20
Payer: COMMERCIAL

## 2023-06-20 VITALS
HEART RATE: 82 BPM | BODY MASS INDEX: 39.78 KG/M2 | WEIGHT: 233 LBS | HEIGHT: 64 IN | DIASTOLIC BLOOD PRESSURE: 80 MMHG | SYSTOLIC BLOOD PRESSURE: 169 MMHG

## 2023-06-20 DIAGNOSIS — S93.602A SPRAIN OF LEFT FOOT, INITIAL ENCOUNTER: ICD-10-CM

## 2023-06-20 DIAGNOSIS — M19.072 PRIMARY OSTEOARTHRITIS OF LEFT FOOT: Primary | ICD-10-CM

## 2023-06-20 DIAGNOSIS — M25.572 PAIN IN JOINT OF LEFT FOOT: ICD-10-CM

## 2023-06-20 DIAGNOSIS — M25.775 OSTEOPHYTE, LEFT FOOT: ICD-10-CM

## 2023-06-20 PROCEDURE — 73630 X-RAY EXAM OF FOOT: CPT

## 2023-06-20 PROCEDURE — 99213 OFFICE O/P EST LOW 20 MIN: CPT | Performed by: PODIATRIST

## 2023-06-20 NOTE — PROGRESS NOTES
PATIENT:  Suzan Otero  1960       ASSESSMENT:     1  Primary osteoarthritis of left foot  Cam Boot      2  Osteophyte, left foot        3  Pain in joint of left foot  XR foot 3+ vw left      4  Sprain of left foot, initial encounter  Cam Boot                PLAN:  1  Patient was counseled and educated on the condition and the diagnosis  2  Reviewed X-ray of left ankle  Left foot X-rays was also obtained and personally reviewed  The radiological findings were discussed with the patient  3  There is no acute osseous injury to left foot and ankle  The diagnosis, treatment options and prognosis were discussed with the patient  4  Start her on CAM walker  5  Will consider exostectomy of left midfoot once she recovers from new injury  6  Patient will return in 4 weeks for re-evaluation  Imaging: I have personally reviewed pertinent films in PACS  Labs, pathology, and Other Studies: I have personally reviewed pertinent reports  Procedures      Subjective:       HPI  The patient presents for follow-up on left foot pain  She rolled left foot / ankle on Memorial day  She also injured left knee  She went to ED and X-ray was taken  She is seeing orthopedist for left knee pain  The last injection did not help her  The following portions of the patient's history were reviewed and updated as appropriate: allergies, current medications, past family history, past medical history, past social history, past surgical history and problem list   All pertinent labs and images were reviewed        Past Medical History  Past Medical History:   Diagnosis Date   • Arthritis    • Asthma    • Atrial fibrillation ([facility] [address] )    • Nondisplaced fracture of distal phalanx of left great toe    • Osteoarthritis    • Other specific joint derangements of right foot, not elsewhere classified        Past Surgical History  Past Surgical History:   Procedure Laterality Date   • BREAST LUMPECTOMY • CHOLECYSTECTOMY  1982   • FOOT SURGERY  2009   • HYSTERECTOMY  2000   • INSERT / REPLACE / REMOVE PACEMAKER     • MOUTH SURGERY          Allergies:  Lomotil [diphenoxylate]    Medications:  Current Outpatient Medications   Medication Sig Dispense Refill   • ALPRAZolam (XANAX) 0 5 mg tablet Take by mouth daily at bedtime as needed for anxiety     • amLODIPine (NORVASC) 10 mg tablet Take 1 tablet (10 mg total) by mouth daily 90 tablet 3   • atorvastatin (LIPITOR) 40 mg tablet TAKE 1 TABLET BY MOUTH AT BEDTIME 90 tablet 3   • Eliquis 5 MG TAKE 1 TABLET BY MOUTH TWICE DAILY 180 tablet 0   • lisinopril-hydrochlorothiazide (PRINZIDE,ZESTORETIC) 20-25 MG per tablet TAKE 1 TABLET BY MOUTH DAILY 90 tablet 3   • mirtazapine (REMERON) 7 5 MG tablet      • pantoprazole (PROTONIX) 40 mg tablet TAKE 1 TABLET BY MOUTH BEFORE BREAKFAST     • sertraline (ZOLOFT) 100 mg tablet Take 100 mg by mouth daily     • sertraline (ZOLOFT) 25 mg tablet TAKE 1 TABLET BY MOUTH DAILY WITH 100MG     • sotalol (BETAPACE) 80 mg tablet TAKE 1 TABLET BY MOUTH TWICE DAILY 180 tablet 2   • VENTOLIN  (90 Base) MCG/ACT inhaler 2 puffs every 4 (four) hours as needed     • bimatoprost (LUMIGAN) 0 01 % ophthalmic drops Administer 1 drop to both eyes Daily      • cycloSPORINE (RESTASIS) 0 05 % ophthalmic emulsion Administer 1 drop to both eyes every 12 (twelve) hours     • diazepam (VALIUM) 5 mg tablet  (Patient not taking: Reported on 6/7/2023)     • dorzolamide-timolol (COSOPT) 2-0 5 % ophthalmic solution Administer 1 drop to both eyes 2 (two) times a day (Patient not taking: Reported on 6/7/2023)     • methylPREDNISolone 4 MG tablet therapy pack Follow package directions     • Netarsudil Dimesylate 0 02 % SOLN Apply 1 drop to eye daily at bedtime     • olopatadine HCl (PATADAY) 0 2 % opth drops Administer 1 drop to both eyes 2 (two) times a day     • polyethylene glycol-propylene glycol (SYSTANE) 0 4-0 3 % Administer 1 drop to both eyes every 3 "(three) hours as needed      • tiZANidine (ZANAFLEX) 4 mg tablet Take 4 mg by mouth 3 (three) times a day as needed       Current Facility-Administered Medications   Medication Dose Route Frequency Provider Last Rate Last Admin   • lidocaine (XYLOCAINE) 1 % injection 2 mL  2 mL Injection  Paul Arben, DPM   2 mL at 23 1223   • triamcinolone acetonide (KENALOG-40) 40 mg/mL injection 20 mg  20 mg Intra-articular  Paul Arben, DPM   20 mg at 23 1223       Social History:  Social History     Socioeconomic History   • Marital status: /Civil Union     Spouse name: None   • Number of children: None   • Years of education: None   • Highest education level: None   Occupational History   • None   Tobacco Use   • Smoking status: Former     Packs/day: 0 00     Years: 15 00     Total pack years: 0 00     Types: Cigarettes     Quit date: 2009     Years since quittin 1   • Smokeless tobacco: Never   Substance and Sexual Activity   • Alcohol use: No   • Drug use: No   • Sexual activity: Not Currently     Partners: Male     Birth control/protection: Surgical   Other Topics Concern   • None   Social History Narrative   • None     Social Determinants of Health     Financial Resource Strain: Not on file   Food Insecurity: Not on file   Transportation Needs: Not on file   Physical Activity: Not on file   Stress: Not on file   Social Connections: Not on file   Intimate Partner Violence: Not on file   Housing Stability: Not on file          Review of Systems   Constitutional: Negative for chills and fever  Respiratory: Negative for cough and shortness of breath  Cardiovascular: Negative for chest pain  Gastrointestinal: Negative for nausea and vomiting  Musculoskeletal: Positive for arthralgias  Allergic/Immunologic: Negative for immunocompromised state  Neurological: Negative for weakness           Objective:      /80   Pulse 82   Ht 5' 4\" (1 626 m)   Wt 106 kg (233 lb)   BMI 39 99 kg/m² " Physical Exam  Vitals reviewed  Constitutional:       General: She is not in acute distress  Appearance: She is obese  She is not toxic-appearing  Cardiovascular:      Rate and Rhythm: Normal rate and regular rhythm  Pulses: No decreased pulses  Dorsalis pedis pulses are 2+ on the right side and 1+ on the left side  Posterior tibial pulses are 1+ on the right side and 1+ on the left side  Pulmonary:      Effort: Pulmonary effort is normal  No respiratory distress  Musculoskeletal:         General: Tenderness present  No signs of injury  Right lower leg: No edema  Left lower leg: No edema  Right foot: No foot drop  Left foot: No foot drop  Comments: Focal pain around left sinus tarsi  Focal pain over left NCJ with hypertrophy / osteophyte  Skin:     General: Skin is warm  Capillary Refill: Capillary refill takes less than 2 seconds  Coloration: Skin is not cyanotic or mottled  Findings: No abscess, ecchymosis or wound  Nails: There is no clubbing  Neurological:      General: No focal deficit present  Mental Status: She is alert and oriented to person, place, and time  Cranial Nerves: No cranial nerve deficit  Motor: No weakness  Coordination: Coordination normal    Psychiatric:         Mood and Affect: Mood normal          Behavior: Behavior normal          Thought Content:  Thought content normal          Judgment: Judgment normal

## 2023-06-23 ENCOUNTER — HOSPITAL ENCOUNTER (OUTPATIENT)
Dept: NON INVASIVE DIAGNOSTICS | Facility: CLINIC | Age: 63
Discharge: HOME | End: 2023-06-23
Payer: COMMERCIAL

## 2023-06-23 DIAGNOSIS — I10 HYPERTENSION, UNSPECIFIED TYPE: ICD-10-CM

## 2023-06-23 PROCEDURE — 93975 VASCULAR STUDY: CPT

## 2023-06-23 PROCEDURE — 93975 VASCULAR STUDY: CPT | Performed by: SURGERY

## 2023-06-29 ENCOUNTER — REMOTE DEVICE CLINIC VISIT (OUTPATIENT)
Dept: CARDIOLOGY CLINIC | Facility: CLINIC | Age: 63
End: 2023-06-29
Payer: COMMERCIAL

## 2023-06-29 DIAGNOSIS — Z95.0 CARDIAC PACEMAKER IN SITU: Primary | ICD-10-CM

## 2023-06-29 PROCEDURE — 93294 REM INTERROG EVL PM/LDLS PM: CPT | Performed by: INTERNAL MEDICINE

## 2023-06-29 PROCEDURE — 93296 REM INTERROG EVL PM/IDS: CPT | Performed by: INTERNAL MEDICINE

## 2023-06-29 NOTE — PROGRESS NOTES
"Results for orders placed or performed in visit on 06/29/23   Cardiac EP device report    Narrative    MDT-DUAL CHAMBER PPM (AAIR-DDDR MODE)/ACTIVE SYSTEM IS MRI CONDITIONAL  CARELINK TRANSMISSION: BATTERY STATUS \"4 YRS  \" AP 95%  0%  ALL AVAILABLE LEAD PARAMETERS WITHIN NORMAL LIMITS  1 VHR NOTED; EGRAM PRESENTS AS SVT @ 150 BPM  PT ON SOTALOL & ELIQUIS  EF 65% (ECHO 2021)  NORMAL DEVICE FUNCTION   NC         "

## 2023-06-30 ENCOUNTER — TELEPHONE (OUTPATIENT)
Dept: CARDIOLOGY CLINIC | Facility: CLINIC | Age: 63
End: 2023-06-30

## 2023-06-30 NOTE — TELEPHONE ENCOUNTER
----- Message -----  From: Neela Dixon MD  Sent: 6/28/2023   2:02 PM EDT  To: [facility] [place] [facility]    Results are normal  Please notify pt

## 2023-07-06 ENCOUNTER — TELEPHONE (OUTPATIENT)
Dept: PODIATRY | Facility: CLINIC | Age: 63
End: 2023-07-06

## 2023-07-06 NOTE — TELEPHONE ENCOUNTER
Caller:Patient    Doctor: Sacha Cherry    Reason for call:Needs to know if she should should go to PT.   Her foot is extremely swollen    Call back#:[phone]

## 2023-07-11 ENCOUNTER — PREP FOR PROCEDURE (OUTPATIENT)
Dept: OBGYN CLINIC | Facility: CLINIC | Age: 63
End: 2023-07-11

## 2023-07-11 ENCOUNTER — OFFICE VISIT (OUTPATIENT)
Dept: PODIATRY | Facility: CLINIC | Age: 63
End: 2023-07-11
Payer: COMMERCIAL

## 2023-07-11 VITALS
SYSTOLIC BLOOD PRESSURE: 157 MMHG | HEIGHT: 64 IN | HEART RATE: 73 BPM | BODY MASS INDEX: 39.78 KG/M2 | WEIGHT: 233 LBS | DIASTOLIC BLOOD PRESSURE: 90 MMHG

## 2023-07-11 DIAGNOSIS — Z01.818 PREOPERATIVE CLEARANCE: Primary | ICD-10-CM

## 2023-07-11 DIAGNOSIS — M25.775 OSTEOPHYTE, LEFT FOOT: ICD-10-CM

## 2023-07-11 DIAGNOSIS — M19.072 PRIMARY OSTEOARTHRITIS OF LEFT FOOT: Primary | ICD-10-CM

## 2023-07-11 PROCEDURE — 99213 OFFICE O/P EST LOW 20 MIN: CPT | Performed by: PODIATRIST

## 2023-07-11 PROCEDURE — 20605 DRAIN/INJ JOINT/BURSA W/O US: CPT | Performed by: PODIATRIST

## 2023-07-11 RX ORDER — LIDOCAINE HYDROCHLORIDE 10 MG/ML
2 INJECTION, SOLUTION INFILTRATION; PERINEURAL
Status: SHIPPED | OUTPATIENT
Start: 2023-07-11

## 2023-07-11 RX ORDER — TRIAMCINOLONE ACETONIDE 40 MG/ML
40 INJECTION, SUSPENSION INTRA-ARTICULAR; INTRAMUSCULAR
Status: SHIPPED | OUTPATIENT
Start: 2023-07-11

## 2023-07-11 RX ADMIN — LIDOCAINE HYDROCHLORIDE 2 ML: 10 INJECTION, SOLUTION INFILTRATION; PERINEURAL at 16:30

## 2023-07-11 RX ADMIN — TRIAMCINOLONE ACETONIDE 40 MG: 40 INJECTION, SUSPENSION INTRA-ARTICULAR; INTRAMUSCULAR at 16:30

## 2023-07-11 NOTE — PROGRESS NOTES
PATIENT:  Shirley Meza  1960       ASSESSMENT:     1. Primary osteoarthritis of left foot  Medium joint arthrocentesis: L ankle      2. Osteophyte, left foot                  PLAN:  1. Patient was counseled and educated on the condition and the diagnosis. 2. Reviewed X-ray of left foot and ankle. 3. The diagnosis, treatment options and prognosis were discussed with the patient. 4. Will try an injection to left lateral ankle. Continue CAM walker. 5. She wished to proceed with exostectomy of left dorsal foot at this time. Explained surgical details and post-op course. Discussed all risks and complications related to the patient's condition and surgery. The benefits of surgery were also discussed. The patient understood that the surgery would not guarantee desirable outcome. All questions and concerns were addressed and the consent was signed. 6. Will plan OR. Imaging: I have personally reviewed pertinent films in PACS  Labs, pathology, and Other Studies: I have personally reviewed pertinent reports. Medium joint arthrocentesis: L ankle  Universal Protocol:  Consent: Verbal consent obtained. Risks and benefits: risks, benefits and alternatives were discussed  Consent given by: patient  Time out: Immediately prior to procedure a "time out" was called to verify the correct patient, procedure, equipment, support staff and site/side marked as required.   Timeout called at: 7/11/2023 5:11 PM.  Patient understanding: patient states understanding of the procedure being performed  Site marked: the operative site was marked  Patient identity confirmed: verbally with patient    Supporting Documentation  Indications: pain   Procedure Details  Location: ankle - L ankle  Needle size: 25 G  Ultrasound guidance: no  Approach: anterolateral  Medications administered: 40 mg triamcinolone acetonide 40 mg/mL; 2 mL lidocaine 1 %    Patient tolerance: patient tolerated the procedure well with no immediate complications  Dressing:  Sterile dressing applied    Injection given around lateral gutter of left ankle / STJ. Subjective:       HPI  The patient presents for follow-up on left foot pain. She continues to have pain in left dorsal foot and ankle. PT does not seem to help. She still has swelling in left ankle at times. The following portions of the patient's history were reviewed and updated as appropriate: allergies, current medications, past family history, past medical history, past social history, past surgical history and problem list.  All pertinent labs and images were reviewed.       Past Medical History  Past Medical History:   Diagnosis Date   • Arthritis    • Asthma    • Atrial fibrillation ([address])    • Nondisplaced fracture of distal phalanx of left great toe    • Osteoarthritis    • Other specific joint derangements of right foot, not elsewhere classified        Past Surgical History  Past Surgical History:   Procedure Laterality Date   • BREAST LUMPECTOMY     • CHOLECYSTECTOMY  1982   • FOOT SURGERY  2009   • HYSTERECTOMY  2000   • INSERT / REPLACE / REMOVE PACEMAKER     • MOUTH SURGERY          Allergies:  Lomotil [diphenoxylate]    Medications:  Current Outpatient Medications   Medication Sig Dispense Refill   • ALPRAZolam (XANAX) 0.5 mg tablet Take by mouth daily at bedtime as needed for anxiety     • amLODIPine (NORVASC) 10 mg tablet Take 1 tablet (10 mg total) by mouth daily 90 tablet 3   • atorvastatin (LIPITOR) 40 mg tablet TAKE 1 TABLET BY MOUTH AT BEDTIME 90 tablet 3   • Eliquis 5 MG TAKE 1 TABLET BY MOUTH TWICE DAILY 180 tablet 0   • lisinopril-hydrochlorothiazide (PRINZIDE,ZESTORETIC) 20-25 MG per tablet TAKE 1 TABLET BY MOUTH DAILY 90 tablet 3   • mirtazapine (REMERON) 7.5 MG tablet      • pantoprazole (PROTONIX) 40 mg tablet TAKE 1 TABLET BY MOUTH BEFORE BREAKFAST     • sertraline (ZOLOFT) 100 mg tablet Take 100 mg by mouth daily     • sertraline (ZOLOFT) 25 mg tablet TAKE 1 TABLET BY MOUTH DAILY WITH 100MG     • sotalol (BETAPACE) 80 mg tablet TAKE 1 TABLET BY MOUTH TWICE DAILY 180 tablet 2   • VENTOLIN  (90 Base) MCG/ACT inhaler 2 puffs every 4 (four) hours as needed     • bimatoprost (LUMIGAN) 0.01 % ophthalmic drops Administer 1 drop to both eyes Daily      • cycloSPORINE (RESTASIS) 0.05 % ophthalmic emulsion Administer 1 drop to both eyes every 12 (twelve) hours     • diazepam (VALIUM) 5 mg tablet  (Patient not taking: Reported on 2023)     • dorzolamide-timolol (COSOPT) 2-0.5 % ophthalmic solution Administer 1 drop to both eyes 2 (two) times a day (Patient not taking: Reported on 2023)     • methylPREDNISolone 4 MG tablet therapy pack Follow package directions     • Netarsudil Dimesylate 0.02 % SOLN Apply 1 drop to eye daily at bedtime     • olopatadine HCl (PATADAY) 0.2 % opth drops Administer 1 drop to both eyes 2 (two) times a day     • polyethylene glycol-propylene glycol (SYSTANE) 0.4-0.3 % Administer 1 drop to both eyes every 3 (three) hours as needed      • tiZANidine (ZANAFLEX) 4 mg tablet Take 4 mg by mouth 3 (three) times a day as needed       Current Facility-Administered Medications   Medication Dose Route Frequency Provider Last Rate Last Admin   • lidocaine (XYLOCAINE) 1 % injection 2 mL  2 mL Injection  Mauricio Cardoza, DPM   2 mL at 23 1223   • triamcinolone acetonide (KENALOG-40) 40 mg/mL injection 20 mg  20 mg Intra-articular  Mauricio Pealyndseymaker, DPM   20 mg at 23 1223       Social History:  Social History     Socioeconomic History   • Marital status: /Civil Union     Spouse name: None   • Number of children: None   • Years of education: None   • Highest education level: None   Occupational History   • None   Tobacco Use   • Smoking status: Former     Packs/day: 0.00     Years: 15.00     Total pack years: 0.00     Types: Cigarettes     Quit date: 2009     Years since quittin.2   • Smokeless tobacco: Never   Substance and Sexual Activity   • Alcohol use: No   • Drug use: No   • Sexual activity: Not Currently     Partners: Male     Birth control/protection: Surgical   Other Topics Concern   • None   Social History Narrative   • None     Social Determinants of Health     Financial Resource Strain: Not on file   Food Insecurity: Not on file   Transportation Needs: Not on file   Physical Activity: Not on file   Stress: Not on file   Social Connections: Not on file   Intimate Partner Violence: Not on file   Housing Stability: Not on file          Review of Systems   Constitutional: Negative for chills and fever. Respiratory: Negative for cough and shortness of breath. Cardiovascular: Negative for chest pain. Gastrointestinal: Negative for nausea and vomiting. Musculoskeletal: Positive for arthralgias. Allergic/Immunologic: Negative for immunocompromised state. Neurological: Negative for weakness. Objective:      /90   Pulse 73   Ht 5' 4" (1.626 m)   Wt 106 kg (233 lb)   BMI 39.99 kg/m²          Physical Exam  Vitals reviewed. Constitutional:       General: She is not in acute distress. Appearance: She is obese. She is not toxic-appearing. Cardiovascular:      Rate and Rhythm: Normal rate and regular rhythm. Pulses: No decreased pulses. Dorsalis pedis pulses are 2+ on the right side and 1+ on the left side. Posterior tibial pulses are 1+ on the right side and 1+ on the left side. Pulmonary:      Effort: Pulmonary effort is normal. No respiratory distress. Musculoskeletal:         General: Tenderness present. No signs of injury. Right lower leg: No edema. Left lower leg: No edema. Right foot: No foot drop. Left foot: No foot drop. Comments: Focal pain around sinus tarsi and lateral gutter / ATFL left ankle. Focal pain over left NCJ with hypertrophy / osteophyte. Skin:     General: Skin is warm.       Capillary Refill: Capillary refill takes less than 2 seconds. Coloration: Skin is not cyanotic or mottled. Findings: No abscess, ecchymosis or wound. Nails: There is no clubbing. Neurological:      General: No focal deficit present. Mental Status: She is alert and oriented to person, place, and time. Cranial Nerves: No cranial nerve deficit. Motor: No weakness. Coordination: Coordination normal.   Psychiatric:         Mood and Affect: Mood normal.         Behavior: Behavior normal.         Thought Content:  Thought content normal.         Judgment: Judgment normal.

## 2023-07-12 ENCOUNTER — TELEPHONE (OUTPATIENT)
Dept: OBGYN CLINIC | Facility: CLINIC | Age: 63
End: 2023-07-12

## 2023-07-12 NOTE — TELEPHONE ENCOUNTER
Left detailed message for patient providing the time/date of her Medical Clearance appointment for upcoming surgery with Dr. Leydi Carrera. Dr. Amauri Wakefield 7/31/23 3pm- [facility]. Also informed patient that her cardiologist will send note. No appointment needed.

## 2023-07-14 ENCOUNTER — PREP FOR PROCEDURE (OUTPATIENT)
Dept: PODIATRY | Facility: CLINIC | Age: 63
End: 2023-07-14

## 2023-07-14 DIAGNOSIS — M25.775 OSTEOPHYTE, LEFT FOOT: Primary | ICD-10-CM

## 2023-07-14 DIAGNOSIS — M19.072 PRIMARY OSTEOARTHRITIS OF LEFT FOOT: ICD-10-CM

## 2023-07-14 RX ORDER — CHLORHEXIDINE GLUCONATE 4 G/100ML
SOLUTION TOPICAL DAILY PRN
OUTPATIENT
Start: 2023-08-18

## 2023-07-14 RX ORDER — CHLORHEXIDINE GLUCONATE 0.12 MG/ML
15 RINSE ORAL ONCE
OUTPATIENT
Start: 2023-08-18

## 2023-07-23 DIAGNOSIS — I48.0 PAROXYSMAL ATRIAL FIBRILLATION (HCC): ICD-10-CM

## 2023-07-23 DIAGNOSIS — I10 HYPERTENSION, UNSPECIFIED TYPE: ICD-10-CM

## 2023-07-27 ENCOUNTER — TELEPHONE (OUTPATIENT)
Dept: CARDIOLOGY CLINIC | Facility: CLINIC | Age: 63
End: 2023-07-27

## 2023-07-27 DIAGNOSIS — I48.0 PAROXYSMAL ATRIAL FIBRILLATION (HCC): ICD-10-CM

## 2023-07-27 RX ORDER — SOTALOL HYDROCHLORIDE 80 MG/1
80 TABLET ORAL 2 TIMES DAILY
Qty: 180 TABLET | Refills: 3 | Status: SHIPPED | OUTPATIENT
Start: 2023-07-27

## 2023-07-27 NOTE — TELEPHONE ENCOUNTER
----- Message from Moni Leavitt MD sent at 7/27/2023 10:50 AM EDT -----  Regarding: FW: Feeling sluggish   Contact: [phone]  Marcia,    Please let her know that we can decrease her amlodipine to 5 mg and she can continue to keep an eye on her blood pressures, hopefully this will make her feel better, amlodipine can cause swelling at 10 mg dose        ----- Message -----  From: Jeanmarie Booth MA  Sent: 7/26/2023   9:54 AM EDT  To: Moni Leavitt MD  Subject: FW: Feeling sluggish                               ----- Message -----  From: Shaniqua Fiar  Sent: 7/25/2023   8:39 AM EDT  To: Moni Leavitt MD; Jeanmarie Booth MA  Subject: FW: Feeling sluggish                               ----- Message -----  From: Darell Cedeño  Sent: 7/24/2023   6:07 PM EDT  To: [facility]  Subject: Feeling sluggish                                 Hi Dr Stacey Gallardo,   My ankles especially my left foot that I have all the problems with swells up the right one does but not like the left one and I’m really tired can’t do to much it’s like I has a workout when I go up the steps or really walk anywhere I’m tired and out of breath. I run to sit down I know you upped the one medication from 5mg to 10mg not sure if it’s that or not. I recently had blood work done not sure if you can see it or not but, I’m having surgery on my left foot Aug 18th for my bone spurs. I have to stop in the middle is the steps going upstairs out of breath and extremely tired.   Let me know if there’s anything I can do   Thanks,  Marci Fabian Discussion/Summary   mammogram is negative per radiologist reading.      Sincerely,           Verified Results  MA FFDM SCREEN W CAD KAYLA [id number] 12:32PM CARIDAD SHAH     Test Name Result Flag Reference   MA [facility]DM SCREEN W CAD KAYLA (Report)     Accession #    [id number]    #[id number] - MA FFDM SCREEN W CAD KAYLA  BILATERAL DIGITAL SCREENING MAMMOGRAM WITH CAD: 2/21/2017    CLINICAL HISTORY:66 year old woman presenting for her screening mammogram without any new breast   complaints.    COMPARISON:   No prior exams were available for comparison.    FINDINGS:  The tissue of both breasts is predominantly fatty.  There are benign calcifications in the left breast.    No significant masses, calcifications, or other findings are seen in either breast.  Current study was also evaluated with a Computer Aided Detection (CAD) system.    IMPRESSION: BENIGN    There is no mammographic evidence of malignancy. A 1 year screening mammogram is recommended.    MAMMOGRAPHY BI-RADS: 2 BENIGN    Collette leone/penrad:2/21/2017 13:50:26    Imaging Technologist: Pamela SANCHEZ(R)(M), [facility]  letter sent: Normal Single Exam  [id number]     **** FINAL ****    Dictated By:   COLLETTE CALERO MD    Electronically Reviewed and Approved By:  COLLETTE CALERO MD

## 2023-07-31 DIAGNOSIS — I10 HYPERTENSION, UNSPECIFIED TYPE: ICD-10-CM

## 2023-08-01 RX ORDER — LISINOPRIL AND HYDROCHLOROTHIAZIDE 25; 20 MG/1; MG/1
1 TABLET ORAL DAILY
Qty: 90 TABLET | Refills: 3 | Status: SHIPPED | OUTPATIENT
Start: 2023-08-01 | End: 2023-08-02 | Stop reason: SDUPTHER

## 2023-08-01 RX ORDER — SOTALOL HYDROCHLORIDE 80 MG/1
TABLET ORAL
Qty: 180 TABLET | Refills: 2 | Status: SHIPPED | OUTPATIENT
Start: 2023-08-01

## 2023-08-02 RX ORDER — LISINOPRIL AND HYDROCHLOROTHIAZIDE 25; 20 MG/1; MG/1
1 TABLET ORAL DAILY
Qty: 90 TABLET | Refills: 0 | Status: SHIPPED | OUTPATIENT
Start: 2023-08-02

## 2023-08-15 ENCOUNTER — ANESTHESIA EVENT (OUTPATIENT)
Dept: PERIOP | Facility: HOSPITAL | Age: 63
End: 2023-08-15
Payer: COMMERCIAL

## 2023-08-15 DIAGNOSIS — Z91.89 AT RISK FOR SLEEP APNEA: Primary | ICD-10-CM

## 2023-08-15 NOTE — PRE-PROCEDURE INSTRUCTIONS
Pre-Surgery Instructions:   Medication Instructions   • ALPRAZolam (XANAX) 0.5 mg tablet Uses PRN- OK to take day of surgery   • amLODIPine (NORVASC) 10 mg tablet Take day of surgery. • atorvastatin (LIPITOR) 40 mg tablet Take night before surgery   • dorzolamide-timolol (COSOPT) 2-0.5 % ophthalmic solution Take day of surgery. • Eliquis 5 MG last dose 8/15/23   • lisinopril-hydrochlorothiazide (PRINZIDE,ZESTORETIC) 20-25 MG per tablet Hold day of surgery. • pantoprazole (PROTONIX) 40 mg tablet Take day of surgery. • sertraline (ZOLOFT) 100 mg tablet Take day of surgery. • sertraline (ZOLOFT) 25 mg tablet Take day of surgery. • sotalol (BETAPACE) 80 mg tablet Take day of surgery. • VENTOLIN  (90 Base) MCG/ACT inhaler Uses PRN- OK to take day of surgery    Medication instructions for day surgery reviewed. Please use only a sip of water to take your instructed medications. Avoid all over the counter vitamins, supplements and NSAIDS for one week prior to surgery per anesthesia guidelines. Tylenol is ok to take as needed. You will receive a call one business day prior to surgery with an arrival time and hospital directions. If your surgery is scheduled on a Monday, the hospital will be calling you on the Friday prior to your surgery. If you have not heard from anyone by 8pm, please call the hospital supervisor through the hospital  at [phone]. Agueda Tilley [phone]). Do not eat or drink anything after midnight the night before your surgery, including candy, mints, lifesavers, or chewing gum. Do not drink alcohol 24hrs before your surgery. Try not to smoke at least 24hrs before your surgery. Follow the pre surgery showering instructions as listed in the [facility] Surgical Experience Booklet” or otherwise provided by your surgeon's office. Do not shave the surgical area 24 hours before surgery.  Do not apply any lotions, creams, including makeup, cologne, deodorant, or perfumes after showering on the day of your surgery. No contact lenses, eye make-up, or artificial eyelashes. Remove nail polish, including gel polish, and any artificial, gel, or acrylic nails if possible. Remove all jewelry including rings and body piercing jewelry. Wear causal clothing that is easy to take on and off. Consider your type of surgery. Keep any valuables, jewelry, piercings at home. Please bring any specially ordered equipment (sling, braces) if indicated. Arrange for a responsible person to drive you to and from the hospital on the day of your surgery. Visitor Guidelines discussed. Call the surgeon's office with any new illnesses, exposures, or additional questions prior to surgery. Please reference your [facility] Surgical Experience Booklet” for additional information to prepare for your upcoming surgery.

## 2023-08-18 ENCOUNTER — ANESTHESIA (OUTPATIENT)
Dept: PERIOP | Facility: HOSPITAL | Age: 63
End: 2023-08-18
Payer: COMMERCIAL

## 2023-08-18 ENCOUNTER — HOSPITAL ENCOUNTER (OUTPATIENT)
Facility: HOSPITAL | Age: 63
Setting detail: OUTPATIENT SURGERY
Discharge: HOME | End: 2023-08-18
Attending: PODIATRIST | Admitting: PODIATRIST
Payer: COMMERCIAL

## 2023-08-18 VITALS
RESPIRATION RATE: 16 BRPM | DIASTOLIC BLOOD PRESSURE: 73 MMHG | OXYGEN SATURATION: 97 % | TEMPERATURE: 97.3 F | WEIGHT: 220 LBS | HEART RATE: 61 BPM | BODY MASS INDEX: 37.56 KG/M2 | SYSTOLIC BLOOD PRESSURE: 113 MMHG | HEIGHT: 64 IN

## 2023-08-18 DIAGNOSIS — Z98.890 POST-OPERATIVE STATE: Primary | ICD-10-CM

## 2023-08-18 PROCEDURE — 99024 POSTOP FOLLOW-UP VISIT: CPT | Performed by: PODIATRIST

## 2023-08-18 PROCEDURE — 28122 PARTIAL REMOVAL OF FOOT BONE: CPT | Performed by: PODIATRIST

## 2023-08-18 RX ORDER — DEXAMETHASONE SODIUM PHOSPHATE 10 MG/ML
INJECTION, SOLUTION INTRAMUSCULAR; INTRAVENOUS AS NEEDED
Status: DISCONTINUED | OUTPATIENT
Start: 2023-08-18 | End: 2023-08-18

## 2023-08-18 RX ORDER — OXYCODONE HYDROCHLORIDE AND ACETAMINOPHEN 5; 325 MG/1; MG/1
1 TABLET ORAL EVERY 4 HOURS PRN
Status: CANCELLED | OUTPATIENT
Start: 2023-08-18

## 2023-08-18 RX ORDER — CHLORHEXIDINE GLUCONATE 4 G/100ML
SOLUTION TOPICAL DAILY PRN
Status: DISCONTINUED | OUTPATIENT
Start: 2023-08-18 | End: 2023-08-18 | Stop reason: HOSPADM

## 2023-08-18 RX ORDER — MIDAZOLAM HYDROCHLORIDE 2 MG/2ML
INJECTION, SOLUTION INTRAMUSCULAR; INTRAVENOUS AS NEEDED
Status: DISCONTINUED | OUTPATIENT
Start: 2023-08-18 | End: 2023-08-18

## 2023-08-18 RX ORDER — FENTANYL CITRATE/PF 50 MCG/ML
25 SYRINGE (ML) INJECTION
Status: DISCONTINUED | OUTPATIENT
Start: 2023-08-18 | End: 2023-08-18 | Stop reason: HOSPADM

## 2023-08-18 RX ORDER — ACETAMINOPHEN 325 MG/1
650 TABLET ORAL EVERY 4 HOURS PRN
Status: CANCELLED | OUTPATIENT
Start: 2023-08-18

## 2023-08-18 RX ORDER — MAGNESIUM HYDROXIDE 1200 MG/15ML
LIQUID ORAL AS NEEDED
Status: DISCONTINUED | OUTPATIENT
Start: 2023-08-18 | End: 2023-08-18 | Stop reason: HOSPADM

## 2023-08-18 RX ORDER — ONDANSETRON 2 MG/ML
INJECTION INTRAMUSCULAR; INTRAVENOUS AS NEEDED
Status: DISCONTINUED | OUTPATIENT
Start: 2023-08-18 | End: 2023-08-18

## 2023-08-18 RX ORDER — PROPOFOL 10 MG/ML
INJECTION, EMULSION INTRAVENOUS AS NEEDED
Status: DISCONTINUED | OUTPATIENT
Start: 2023-08-18 | End: 2023-08-18

## 2023-08-18 RX ORDER — FENTANYL CITRATE 50 UG/ML
INJECTION, SOLUTION INTRAMUSCULAR; INTRAVENOUS AS NEEDED
Status: DISCONTINUED | OUTPATIENT
Start: 2023-08-18 | End: 2023-08-18

## 2023-08-18 RX ORDER — CHLORHEXIDINE GLUCONATE 0.12 MG/ML
15 RINSE ORAL ONCE
Status: DISCONTINUED | OUTPATIENT
Start: 2023-08-18 | End: 2023-08-18 | Stop reason: HOSPADM

## 2023-08-18 RX ORDER — CEFAZOLIN SODIUM 2 G/50ML
2000 SOLUTION INTRAVENOUS ONCE
Status: COMPLETED | OUTPATIENT
Start: 2023-08-18 | End: 2023-08-18

## 2023-08-18 RX ORDER — LIDOCAINE HYDROCHLORIDE 10 MG/ML
INJECTION, SOLUTION EPIDURAL; INFILTRATION; INTRACAUDAL; PERINEURAL AS NEEDED
Status: DISCONTINUED | OUTPATIENT
Start: 2023-08-18 | End: 2023-08-18

## 2023-08-18 RX ORDER — TRIAMCINOLONE ACETONIDE 40 MG/ML
INJECTION, SUSPENSION INTRA-ARTICULAR; INTRAMUSCULAR AS NEEDED
Status: DISCONTINUED | OUTPATIENT
Start: 2023-08-18 | End: 2023-08-18 | Stop reason: HOSPADM

## 2023-08-18 RX ORDER — ONDANSETRON 2 MG/ML
4 INJECTION INTRAMUSCULAR; INTRAVENOUS ONCE AS NEEDED
Status: DISCONTINUED | OUTPATIENT
Start: 2023-08-18 | End: 2023-08-18 | Stop reason: HOSPADM

## 2023-08-18 RX ORDER — OXYCODONE HYDROCHLORIDE AND ACETAMINOPHEN 5; 325 MG/1; MG/1
1 TABLET ORAL EVERY 4 HOURS PRN
Qty: 15 TABLET | Refills: 0 | Status: SHIPPED | OUTPATIENT
Start: 2023-08-18 | End: 2023-08-19 | Stop reason: ALTCHOICE

## 2023-08-18 RX ORDER — SODIUM CHLORIDE, SODIUM LACTATE, POTASSIUM CHLORIDE, CALCIUM CHLORIDE 600; 310; 30; 20 MG/100ML; MG/100ML; MG/100ML; MG/100ML
50 INJECTION, SOLUTION INTRAVENOUS CONTINUOUS
Status: DISCONTINUED | OUTPATIENT
Start: 2023-08-18 | End: 2023-08-18 | Stop reason: HOSPADM

## 2023-08-18 RX ADMIN — FENTANYL CITRATE 50 MCG: 50 INJECTION, SOLUTION INTRAMUSCULAR; INTRAVENOUS at 09:09

## 2023-08-18 RX ADMIN — PROPOFOL 200 MG: 10 INJECTION, EMULSION INTRAVENOUS at 08:49

## 2023-08-18 RX ADMIN — ONDANSETRON 4 MG: 2 INJECTION INTRAMUSCULAR; INTRAVENOUS at 09:33

## 2023-08-18 RX ADMIN — DEXAMETHASONE SODIUM PHOSPHATE 10 MG: 10 INJECTION, SOLUTION INTRAMUSCULAR; INTRAVENOUS at 09:15

## 2023-08-18 RX ADMIN — FENTANYL CITRATE 50 MCG: 50 INJECTION, SOLUTION INTRAMUSCULAR; INTRAVENOUS at 09:53

## 2023-08-18 RX ADMIN — MIDAZOLAM HYDROCHLORIDE 2 MG: 1 INJECTION, SOLUTION INTRAMUSCULAR; INTRAVENOUS at 08:49

## 2023-08-18 RX ADMIN — CEFAZOLIN SODIUM 2000 MG: 2 SOLUTION INTRAVENOUS at 08:50

## 2023-08-18 RX ADMIN — LIDOCAINE HYDROCHLORIDE 50 MG: 10 INJECTION, SOLUTION EPIDURAL; INFILTRATION; INTRACAUDAL; PERINEURAL at 08:49

## 2023-08-18 RX ADMIN — SODIUM CHLORIDE, SODIUM LACTATE, POTASSIUM CHLORIDE, AND CALCIUM CHLORIDE: .6; .31; .03; .02 INJECTION, SOLUTION INTRAVENOUS at 08:31

## 2023-08-18 NOTE — ANESTHESIA POSTPROCEDURE EVALUATION
Post-Op Assessment Note    CV Status:  Stable  Pain Score: 0    Pain management: adequate     Mental Status:  Alert and awake   Hydration Status:  Euvolemic   PONV Controlled:  Controlled   Airway Patency:  Patent      Post Op Vitals Reviewed: Yes      Staff: CRNA         No notable events documented.     /85 (08/18/23 1000)    Temp 99.1 °F (37.3 °C) (08/18/23 0958)    Pulse 64 (08/18/23 1000)   Resp 12 (08/18/23 1000)    SpO2 100 % (08/18/23 1000)

## 2023-08-18 NOTE — DISCHARGE SUMMARY
Discharge Summary Outpatient Procedure Podiatry -   Qiana Nguyễn 60 y.o. female MRN: [id number]  Unit/Bed#: OR POOL Encounter: [id number]    Admission Date: 8/18/2023     Admitting Diagnosis: Osteophyte, left foot [M25.775]  Primary osteoarthritis of left foot [M19.072]    Discharge Diagnosis: same    Procedures Performed: EXCISION EXOSTOSIS LEFT MIDTARSAL JOINT (cpt 92 98 05): Complications: none    Condition at Discharge: stable    Discharge instructions/Information to patient and family:   See after visit summary for information provided to patient and family. Provisions for Follow-Up Care/Important appointments:  See after visit summary for information related to follow-up care and any pertinent home health orders. Discharge Medications:  See after visit summary for reconciled discharge medications provided to patient and family.

## 2023-08-18 NOTE — ANESTHESIA PREPROCEDURE EVALUATION
Procedure:  EXCISION EXOSTOSIS LEFT MIDTARSAL JOINT (cpt 92 98 05) (Left: Foot)    Relevant Problems   CARDIO   (+) Atrial fibrillation (HCC)   (+) LIANG (dyspnea on exertion)   (+) Hypertension   (+) Paroxysmal atrial fibrillation (HCC)      MUSCULOSKELETAL   (+) Primary osteoarthritis of left foot      PULMONARY   (+) LIANG (dyspnea on exertion)        Physical Exam    Airway    Mallampati score: II  TM Distance: >3 FB  Neck ROM: full     Dental       Cardiovascular      Pulmonary      Other Findings  Partial dentures removed      Anesthesia Plan  ASA Score- 3     Anesthesia Type- general with ASA Monitors. Additional Monitors:   Airway Plan: LMA. Comment: Hx PPM recently interrogated - pacer dependent (>95% a-paced). Extremity surgery - magnet not necessary but available. .       Plan Factors-Exercise tolerance (METS): >4 METS. Chart reviewed. Patient is not a current smoker. Obstructive sleep apnea risk education given perioperatively. Induction- intravenous. Postoperative Plan- Plan for postoperative opioid use. Planned trial extubation    Informed Consent- Anesthetic plan and risks discussed with patient. I personally reviewed this patient with the CRNA. Discussed and agreed on the Anesthesia Plan with the CRNA. Hiren Fung Anesthesia plan and consent discussed with Ezio Richards who expressed understanding and agreement. Risks/benefits and alternatives discussed with patient including possible PONV, sore throat, damage to teeth/lips/gums and possibility of rare anesthetic and surgical emergencies.

## 2023-08-18 NOTE — INTERVAL H&P NOTE
H&P reviewed. After examining the patient I find no changes in the patients condition since the H&P had been written.     Vitals:    08/18/23 0730   BP: 138/82   Pulse: 74   Resp: 18   Temp: (!) 97 °F (36.1 °C)   SpO2: 98%

## 2023-08-18 NOTE — NURSING NOTE
Pt. With warm lower extremities and good capillary refill at surgical site; left with surgical shoe and is not showing any distress. IV removed and belongings given to patient.

## 2023-08-18 NOTE — DISCHARGE INSTR - AVS FIRST PAGE
Dr. Cristiano Morales Instructions    1. Take your prescribed medication as directed. 2. Upon arrival at home, lie down and elevate your surgical foot on 2 pillows. 3. Stay off your feet as much as possible for the first 24-48 hours. This is when your feet first swell and may become painful. After 48 hours you may begin limited walking following these restrictions:     Weight-bearing as tolerated in surgical shoe     4. Drink large quantities of water. Consume no alcohol. Continue a well-balanced diet. 5. Report any unusual discomfort or fever to this office. 6. A limited amount of discomfort and swelling is to be expected. In some cases the skin may take on a bruised appearance. The surgical cleansing solution that was applied to your foot prior to the operation is dark in color and the operation site may appear to be oozing when it actually is not. 7. A slight amount of blood is to be expected, and is no cause for alarm. Do not remove the dressings. If there is active bleeding and if the bleeding persists, add additional gauze to the bandage, apply direct pressure, elevate your feet and call this office. 8. Do not get the dressings wet. As regular bathing may be inconvenient, sponge baths are recommended. 9. When anesthesia wears off and if any discomfort should be present, apply an ice pack directly over the operated area for 15 minute intervals for several hours or until the pain leaves. (USE IN EXCESS OF 15 MINUTES COULD CAUSE FROSTBITE). Do not use hot water bags or electric pads. A convenient icepack can be made by placing ice cubes in a plastic bag and covering this with a towel. 10. Take over-the-counter laxative for constipation, this is common with use of narcotic medications.

## 2023-08-18 NOTE — OP NOTE
OPERATIVE REPORT - Podiatry  PATIENT NAME: Rishi Frazier    :  1960  MRN: [id number]  Pt Location:  OR ROOM 12    SURGERY DATE: 2023    Surgeon(s) and Role:     * Yvonne Mcintosh DPM - Primary     * Rafa Delarosa DPM - Assisting    Pre-op Diagnosis:  Osteophyte, left foot [M25.775]  Primary osteoarthritis of left foot [M19.072]    Post-Op Diagnosis Codes:     * Osteophyte, left foot [M25.775]     * Primary osteoarthritis of left foot [M19.072]    Procedure(s) (LRB):  EXCISION EXOSTOSIS LEFT MIDTARSAL JOINT (cpt 40500) (Left)    Specimen(s):  * No specimens in log *    Estimated Blood Loss:   Minimal    Drains:  * No LDAs found *    Anesthesia Type:   Choice with 10 ml of 1% Lidocaine and 0.5% Bupivacaine in a 1:1 mixture    Hemostasis:  Pneumatic calf tourniquet set at 250 mmHg for 44 mins  Direct compression, electrocautery    Materials:  * No implants in log *  3-0 and 4-0 vicryl  4-0 nylon    Injectables:  None    Operative Findings: The dorsal midfoot bone prominences were ronguered and rasped to level with adjacent osseous structures. Complications:   None    Procedure and Technique:     Under mild sedation, the patient was brought into the operating room and placed on the operating room table in the supine position. IV sedation was achieved by anesthesia team and a universal timeout was performed where all parties are in agreement of correct patient, correct procedure and correct site. A pneumatic tourniquet was then placed over the patient's left lower extremity with ample padding. A V block was performed consisting of 10 ml of local anesthetic. The foot was then prepped and draped in the usual aseptic manner. An esmarch bandage was used to exsangunate the foot and the pneumatic tourniquet was then inflated to 250 mmHg. Attention was then directed to the dorsal aspect of the left foot where an approximately 3 cm incision was made over the 2nd metatarsal and intermediate cuneiform.  The incision was deepened through the subcutaneous tissue using blunt dissection. Care was taken to identify and retract all vital neural and vascular structures. All bleeders were ligated and cauterized as necessary. The deep fascia was identified and incised, the extensor hallucis brevis tendon was then exposed. The deep peroneal nerve was visualized. Using blunt dissection, the nerve was then traced along its course and freed from any surrounding perineural adhesions and strictures. The dorsal midfoot bone prominences were ronguered and rasped to level with adjacent osseous structures. The area was then flushed with copious amounts of sterile normal saline. The surgical incision was irrigated with copious amounts of normal sterile saline. The periosteal and capsular structures were reapproximated using 3-0 vicryl. Subcutaneous closure was obtained utilizing 4-0 vicryl. Skin edges were reapproximated and closure was obtained utilizing 4-0 nylon. The foot was then cleansed and dried. A postoperative injection consisting of 4 ml of 1% lidocaine and 1cc kenelog 40 was performed. The incision site was dressed with betadine soaked adaptic, gauze. This was then covered with a kerlex and ACE wrap. The tourniquet was deflated at approximately 44 min and normal hyperemic response was noted to all digits. The patient tolerated the procedure and anesthesia well without immediate complications and transferred to PACU with vital signs stable. Dr. Rigoberto Sage was present during the entire procedure and participated in all key aspects. SIGNATURE: Kiarra Corado DPM  DATE: August 18, 2023  TIME: 10:03 AM      Portions of the record may have been created with voice recognition software. Occasional wrong word or "sound a like" substitutions may have occurred due to the inherent limitations of voice recognition software. Read the chart carefully and recognize, using context, where substitutions have occurred.

## 2023-08-19 ENCOUNTER — NURSE TRIAGE (OUTPATIENT)
Dept: OTHER | Facility: OTHER | Age: 63
End: 2023-08-19

## 2023-08-19 DIAGNOSIS — M25.572 PAIN IN JOINT OF LEFT FOOT: Primary | ICD-10-CM

## 2023-08-19 DIAGNOSIS — Z98.890 POSTOPERATIVE STATE: ICD-10-CM

## 2023-08-19 DIAGNOSIS — M19.072 PRIMARY OSTEOARTHRITIS OF LEFT FOOT: ICD-10-CM

## 2023-08-19 RX ORDER — TRAMADOL HYDROCHLORIDE 50 MG/1
50 TABLET ORAL EVERY 6 HOURS PRN
Refills: 0 | OUTPATIENT
Start: 2023-08-19 | End: 2023-08-29

## 2023-08-19 RX ORDER — TRAMADOL HYDROCHLORIDE 50 MG/1
50 TABLET ORAL EVERY 6 HOURS PRN
Qty: 12 TABLET | Refills: 0 | Status: SHIPPED | OUTPATIENT
Start: 2023-08-19 | End: 2023-08-22

## 2023-08-19 NOTE — TELEPHONE ENCOUNTER
Patient with excision exostosis left midtarsal joint surgery yesterday 8/18. Patient c/o severe pain and reaction to percocet. She states she has taken two doses yesterday at 4pm and 10pm. She c/o sweating, itching in the face, the feeling of bugs crawling on her, and loss of sleep. She states she thinks she is allergic to the percocet. TC sent to provider on call. Provider states patient can use benadryl for symptoms and will be switching her to tramadol. Patient made aware. Reason for Disposition  • [1] SEVERE post-op pain (e.g., excruciating, pain scale 8-10) AND [2] not controlled with pain medications    Answer Assessment - Initial Assessment Questions  1. SYMPTOM: "What's the main symptom you're concerned about?" (e.g., pain, fever, vomiting)      Pain and reaction to percocet    2. ONSET: "When did symptoms start?"      Yesterday    3. SURGERY: "What surgery was performed?"     Post excision exostosis left midtarsal joint    4. DATE of SURGERY: "When was surgery performed?"       8/18/23    5. ANESTHESIA: " What type of anesthesia did you have?" (e.g., general, spinal, epidural, local)      General    6. PAIN: "Is there any pain?" If Yes, ask: "How bad is it?"  (Scale 1-10; or mild, moderate, severe)      Severe    7. FEVER: "Do you have a fever?" If Yes, ask: "What is your temperature, how was it measured, and when did it start?"      Denies    8. VOMITING: "Is there any vomiting?" If yes, ask: "How many times?"      Denies    9.  BLEEDING: "Is there any bleeding?" If Yes, ask: "How much?" and "Where?"      Denies    Protocols used: POST-OP SYMPTOMS AND QUESTIONS-[facility]

## 2023-08-19 NOTE — TELEPHONE ENCOUNTER
Regarding: Post EXCISION EXOSTOSIS LEFT MIDTARSAL JOINT Medication Reaction, Itching Face and Head, Sweating  ----- Message from Servando Goss sent at 8/19/2023  9:07 AM EDT -----  "  I had Surgery yesterday for  EXCISION EXOSTOSIS LEFT MIDTARSAL JOINT , I was prescribed Oxycodone Acetaminophen for pain.  I took a dose yesterday 4:00 pm an then again at 10:00 pm. I started having Symptoms of Sweating , itching In my Face, It feels like Ants Crawling on my Face, Itching in my Head also, I have not slept since I left the Hospital.  I have a lot of Pain in my Foot. "

## 2023-08-19 NOTE — PROGRESS NOTES
Patient called in noting reaction to Percocet. Patient will discontinue this med. RX sent in for tramadol 50 mg PO every 6 hours PRN moderate to severe post-op pain. [place]MP reviewed and is consistent.

## 2023-08-24 ENCOUNTER — OFFICE VISIT (OUTPATIENT)
Dept: PODIATRY | Facility: CLINIC | Age: 63
End: 2023-08-24

## 2023-08-24 VITALS
SYSTOLIC BLOOD PRESSURE: 122 MMHG | HEART RATE: 55 BPM | BODY MASS INDEX: 37.56 KG/M2 | WEIGHT: 220 LBS | HEIGHT: 64 IN | DIASTOLIC BLOOD PRESSURE: 85 MMHG

## 2023-08-24 DIAGNOSIS — M19.072 PRIMARY OSTEOARTHRITIS OF LEFT FOOT: Primary | ICD-10-CM

## 2023-08-24 DIAGNOSIS — Z98.890 POSTOPERATIVE STATE: ICD-10-CM

## 2023-08-24 DIAGNOSIS — M25.775 OSTEOPHYTE, LEFT FOOT: ICD-10-CM

## 2023-08-24 PROCEDURE — 99024 POSTOP FOLLOW-UP VISIT: CPT | Performed by: PODIATRIST

## 2023-09-05 ENCOUNTER — OFFICE VISIT (OUTPATIENT)
Dept: PODIATRY | Facility: CLINIC | Age: 63
End: 2023-09-05

## 2023-09-05 VITALS
HEART RATE: 86 BPM | DIASTOLIC BLOOD PRESSURE: 84 MMHG | BODY MASS INDEX: 37.56 KG/M2 | SYSTOLIC BLOOD PRESSURE: 177 MMHG | HEIGHT: 64 IN | WEIGHT: 220 LBS

## 2023-09-05 DIAGNOSIS — M19.072 PRIMARY OSTEOARTHRITIS OF LEFT FOOT: Primary | ICD-10-CM

## 2023-09-05 DIAGNOSIS — M25.775 OSTEOPHYTE, LEFT FOOT: ICD-10-CM

## 2023-09-05 PROCEDURE — 99024 POSTOP FOLLOW-UP VISIT: CPT | Performed by: PODIATRIST

## 2023-09-05 NOTE — PROGRESS NOTES
PATIENT:  Misael Santo      1960    ASSESSMENT     1. Primary osteoarthritis of left foot        2. Osteophyte, left foot               PLAN  Patient is doing well post-operatively. Sutures removed. Steri strips were applied. Instructed skin care and protection. Advance shoes as tolerated. Continue post-op care as instructed. Stressed on patient compliance about proper off-loading, staying off of feet, and proper dressing care. Call if any increase in pain, fevers, calf pain, shortness of breath, or general distress is noted. Patient instructed to go to ER if call is not returned immediately. RA in 3 weeks. HISTORY OF PRESENT ILLNESS  Patient presents for post-op appointment. Post-op pain is resolving well. The patient is feeling well and in good spirits. Patient reported no post-op concern. REVIEW OF SYSTEMS  GENERAL: No fever or chills. HEART: No chest pain, or palpitation  RESPIRATORY:  No SOB or cough  GI: No Nausea, vomit or diarrhea  NEUROLOGIC: No syncope or acute weakness  MUSCULOSKELETAL: No calf pain or edema. PHYSICAL EXAMINATION  BP (!) 177/84   Pulse 86   Ht 5' 4" (1.626 m)   Wt 99.8 kg (220 lb)   BMI 37.76 kg/m²     GENERAL  The patient appears in NAD / non-toxic. Afebrile. VSS    VASCULAR EXAM  Pedal pulses and vascular status are intact. No calf pain or edema bilaterally. No cyanosis. DERMATOLOGIC EXAM  Incision is coapted and healed. No signs of infection. No drainage. Decreased post-op edema and ecchymosis. No necrosis or dehiscence. NEUROLOGIC EXAM  AAO X 3. No focal neurologic deficit. Neurologic status is intact BLE. MUSCULOSKELETAL EXAM  Normal post-op findings. ROM intact. No fluctuation or crepitus.

## 2023-09-18 ENCOUNTER — IN-CLINIC DEVICE VISIT (OUTPATIENT)
Dept: CARDIOLOGY CLINIC | Facility: CLINIC | Age: 63
End: 2023-09-18
Payer: COMMERCIAL

## 2023-09-18 DIAGNOSIS — Z95.0 PRESENCE OF PERMANENT CARDIAC PACEMAKER: Primary | ICD-10-CM

## 2023-09-18 PROCEDURE — 93280 PM DEVICE PROGR EVAL DUAL: CPT | Performed by: INTERNAL MEDICINE

## 2023-09-18 NOTE — PROGRESS NOTES
Results for orders placed or performed in visit on 09/18/23   Cardiac EP device report    Narrative    MDT-DUAL CHAMBER PPM (AAIR-DDDR MODE)/ACTIVE SYSTEM IS MRI CONDITIONAL  DEVICE INTERROGATED IN THE [facility]. BATTERY VOLTAGE ADEQUATE (4 YRS). AP 95.8%  <0.1% ALL LEAD PARAMETERS WITHIN NORMAL LIMITS. 2 NEW VT-NS EPISODES WITH ONE EGM SHOWING A BRIEF RUN OF PAT & NSVT (16 BEATS AT  BPM); OTHER EGM SHOWING PAT WITH QRS SIMILAR TO INRINSIC (8 BEATS  BPM). TAKES ELIQUIS & METOPROLOL. EF 60% (ECHO 11/29/2016). NO PROGRAMMING CHANGES MADE TO DEVICE PARAMETERS. NORMAL DEVICE FUNCTION.  AM/GV

## 2023-09-21 DIAGNOSIS — I48.0 PAROXYSMAL ATRIAL FIBRILLATION (HCC): ICD-10-CM

## 2023-09-22 RX ORDER — APIXABAN 5 MG/1
TABLET, FILM COATED ORAL
Qty: 180 TABLET | Refills: 0 | Status: SHIPPED | OUTPATIENT
Start: 2023-09-22

## 2023-10-21 DIAGNOSIS — E78.5 DYSLIPIDEMIA: ICD-10-CM

## 2023-10-24 RX ORDER — ATORVASTATIN CALCIUM 40 MG/1
TABLET, FILM COATED ORAL
Qty: 90 TABLET | Refills: 3 | Status: SHIPPED | OUTPATIENT
Start: 2023-10-24

## 2023-12-20 ENCOUNTER — REMOTE DEVICE CLINIC VISIT (OUTPATIENT)
Dept: CARDIOLOGY CLINIC | Facility: CLINIC | Age: 63
End: 2023-12-20
Payer: COMMERCIAL

## 2023-12-20 DIAGNOSIS — Z95.0 CARDIAC PACEMAKER IN SITU: Primary | ICD-10-CM

## 2023-12-20 DIAGNOSIS — I48.0 PAROXYSMAL ATRIAL FIBRILLATION (HCC): ICD-10-CM

## 2023-12-20 PROCEDURE — 93296 REM INTERROG EVL PM/IDS: CPT | Performed by: INTERNAL MEDICINE

## 2023-12-20 PROCEDURE — 93294 REM INTERROG EVL PM/LDLS PM: CPT | Performed by: INTERNAL MEDICINE

## 2023-12-20 RX ORDER — APIXABAN 5 MG/1
TABLET, FILM COATED ORAL
Qty: 180 TABLET | Refills: 3 | Status: SHIPPED | OUTPATIENT
Start: 2023-12-20

## 2023-12-20 NOTE — PROGRESS NOTES
Results for orders placed or performed in visit on 12/20/23   Cardiac EP device report    Narrative    MDT-DUAL CHAMBER PPM (AAIR-DDDR MODE)/ACTIVE SYSTEM IS MRI CONDITIONAL  CARELINK TRANSMISSION: BATTERY VOLTAGE ADEQUATE (3.5 YRS). AP-94%, <0.1%. ALL AVAILABLE LEAD PARAMETERS WITHIN NORMAL LIMITS. 2 DEVICE CLASSIFIED NSVT EPISODES IN SEPT- PAT ON EGM'S. PT ON ELIQUIS & SOTALOL. NORMAL DEVICE FUNCTION. GV

## 2024-01-09 ENCOUNTER — TELEPHONE (OUTPATIENT)
Dept: CARDIOLOGY CLINIC | Facility: CLINIC | Age: 64
End: 2024-01-09

## 2024-01-09 NOTE — TELEPHONE ENCOUNTER
Dr Wolfe, the pain specialists would like you to state how long the Eliquis may be held.    Please advise.

## 2024-01-09 NOTE — TELEPHONE ENCOUNTER
Patient is scheduled for B/L Knee Genicular Nerve Block injection on 1/31/24.      [facility] is requesting a hold on Eliquis prior to injections.    Please advise if ok to hold and for how long.

## 2024-02-27 ENCOUNTER — OFFICE VISIT (OUTPATIENT)
Dept: CARDIOLOGY CLINIC | Facility: CLINIC | Age: 64
End: 2024-02-27
Payer: COMMERCIAL

## 2024-02-27 VITALS
BODY MASS INDEX: 39.44 KG/M2 | DIASTOLIC BLOOD PRESSURE: 80 MMHG | WEIGHT: 231 LBS | HEIGHT: 64 IN | HEART RATE: 71 BPM | SYSTOLIC BLOOD PRESSURE: 154 MMHG

## 2024-02-27 DIAGNOSIS — I10 HYPERTENSION, UNSPECIFIED TYPE: ICD-10-CM

## 2024-02-27 DIAGNOSIS — R06.09 DOE (DYSPNEA ON EXERTION): ICD-10-CM

## 2024-02-27 DIAGNOSIS — I48.91 ATRIAL FIBRILLATION, UNSPECIFIED TYPE (HCC): ICD-10-CM

## 2024-02-27 DIAGNOSIS — Z95.0 CARDIAC PACEMAKER IN SITU: Primary | ICD-10-CM

## 2024-02-27 DIAGNOSIS — E78.5 DYSLIPIDEMIA: ICD-10-CM

## 2024-02-27 DIAGNOSIS — I48.0 PAROXYSMAL ATRIAL FIBRILLATION (HCC): ICD-10-CM

## 2024-02-27 PROCEDURE — 93000 ELECTROCARDIOGRAM COMPLETE: CPT | Performed by: INTERNAL MEDICINE

## 2024-02-27 PROCEDURE — 99214 OFFICE O/P EST MOD 30 MIN: CPT | Performed by: INTERNAL MEDICINE

## 2024-02-27 RX ORDER — LISINOPRIL AND HYDROCHLOROTHIAZIDE 25; 20 MG/1; MG/1
2 TABLET ORAL DAILY
Qty: 180 TABLET | Refills: 3 | Status: SHIPPED | OUTPATIENT
Start: 2024-02-27

## 2024-02-27 RX ORDER — AMLODIPINE BESYLATE 10 MG/1
10 TABLET ORAL DAILY
Start: 2024-02-27
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
## 2024-03-04 ENCOUNTER — TELEPHONE (OUTPATIENT)
Dept: CARDIOLOGY CLINIC | Facility: CLINIC | Age: 64
End: 2024-03-04

## 2024-03-04 NOTE — TELEPHONE ENCOUNTER
Steffany from [facility] orth[facility]ics called. Type of Surgery: left knee unicompartmental arthroplasty.       They received cc for 2 day eliquis hold.They're requesting a 3 day eliquis hold, for spinal anesthesia.    Please advise.

## 2024-03-27 ENCOUNTER — TELEPHONE (OUTPATIENT)
Dept: CARDIOLOGY CLINIC | Facility: CLINIC | Age: 64
End: 2024-03-27

## 2024-03-27 NOTE — TELEPHONE ENCOUNTER
Steffany smith [facility] called to request patient's most recent EKG tracing be faxed to their office.    Faxed patient's EKG from 227/24 to   (f) [phone].

## 2024-03-28 ENCOUNTER — REMOTE DEVICE CLINIC VISIT (OUTPATIENT)
Dept: CARDIOLOGY CLINIC | Facility: CLINIC | Age: 64
End: 2024-03-28
Payer: COMMERCIAL

## 2024-03-28 DIAGNOSIS — Z95.0 PRESENCE OF PERMANENT CARDIAC PACEMAKER: Primary | ICD-10-CM

## 2024-03-28 PROCEDURE — 93296 REM INTERROG EVL PM/IDS: CPT | Performed by: INTERNAL MEDICINE

## 2024-03-28 PROCEDURE — 93294 REM INTERROG EVL PM/LDLS PM: CPT | Performed by: INTERNAL MEDICINE

## 2024-03-28 NOTE — PROGRESS NOTES
Results for orders placed or performed in visit on 03/28/24   Cardiac EP device report    Narrative    MDT-DUAL CHAMBER PPM (AAIR-DDDR MODE)/ACTIVE SYSTEM IS MRI CONDITIONAL  CARELINK TRANSMISSION: BATTERY VOLTAGE ADEQUATE. (3.5 YRS) AP 97%  1%. ALL AVAILABLE LEAD PARAMETERS WITHIN NORMAL LIMITS. 3 VHR EPISODES DETECTED; 2 SVT, EPISODE #45 NSVT 9 BEATS @ 320ms. PATIENT IS ON ELIQUIS AND SOTALOL; EF 65% (2021, STRESS ECHO). NORMAL DEVICE FUNCTION.---HERNÁNDEZ

## 2024-04-15 LAB
CHOLEST SERPL-MCNC: 191 MG/DL
CHOLEST/HDLC SERPL: 2.8 {RATIO}
HDLC SERPL-MCNC: 68 MG/DL (ref 23–92)
LDLC SERPL CALC-MCNC: 91 MG/DL
NONHDLC SERPL-MCNC: 123 MG/DL
TRIGL SERPL-MCNC: 161 MG/DL

## 2024-04-17 ENCOUNTER — TELEPHONE (OUTPATIENT)
Dept: CARDIOLOGY CLINIC | Facility: CLINIC | Age: 64
End: 2024-04-17

## 2024-04-17 NOTE — TELEPHONE ENCOUNTER
----- Message -----  From: Michelle Wolfe MD  Sent: 4/16/2024   4:22 PM EDT  To: Cardiology [place] [facility]    Cholesterol levels are stable, please let her know

## 2024-04-17 NOTE — TELEPHONE ENCOUNTER
Called pt. Left a vm with Dr. Wolfe's message. Pt to contact our office with any questions or concerns.

## 2024-06-28 ENCOUNTER — REMOTE DEVICE CLINIC VISIT (OUTPATIENT)
Dept: CARDIOLOGY CLINIC | Facility: CLINIC | Age: 64
End: 2024-06-28
Payer: COMMERCIAL

## 2024-06-28 DIAGNOSIS — Z95.0 PRESENCE OF PERMANENT CARDIAC PACEMAKER: Primary | ICD-10-CM

## 2024-06-28 PROCEDURE — 93296 REM INTERROG EVL PM/IDS: CPT | Performed by: INTERNAL MEDICINE

## 2024-06-28 PROCEDURE — 93294 REM INTERROG EVL PM/LDLS PM: CPT | Performed by: INTERNAL MEDICINE

## 2024-06-28 NOTE — PROGRESS NOTES
MDT DC PM/ACTIVE SYSTEM IS MRI CONDITIONAL   CARELINK TRANSMISSION:  BATTERY VOLTAGE ADEQUATE (3 YR.).  AP 95.2%  <0.1%.  ALL LEAD PARAMETERS WITHIN NORMAL LIMITS.  1 VT-NS EPISODE WITH PAT ON EGM (6 @ 182 BPM).  NORMAL DEVICE FUNCTION.  RG

## 2024-07-02 DIAGNOSIS — I10 HYPERTENSION, UNSPECIFIED TYPE: ICD-10-CM

## 2024-07-02 RX ORDER — AMLODIPINE BESYLATE 10 MG/1
10 TABLET ORAL DAILY
Qty: 100 TABLET | Refills: 1 | Status: SHIPPED | OUTPATIENT
Start: 2024-07-02

## 2024-09-19 ENCOUNTER — IN-CLINIC DEVICE VISIT (OUTPATIENT)
Dept: CARDIOLOGY CLINIC | Facility: CLINIC | Age: 64
End: 2024-09-19
Payer: COMMERCIAL

## 2024-09-19 DIAGNOSIS — Z95.0 PRESENCE OF PERMANENT CARDIAC PACEMAKER: Primary | ICD-10-CM

## 2024-09-19 PROCEDURE — 93280 PM DEVICE PROGR EVAL DUAL: CPT | Performed by: INTERNAL MEDICINE

## 2024-09-19 NOTE — PROGRESS NOTES
Results for orders placed or performed in visit on 09/19/24   Cardiac EP device report    Narrative    MDT DC PM/ACTIVE SYSTEM IS MRI CONDITIONAL  DEVICE INTERROGATED IN THE [place] OFFICE. BATTERY VOLTAGE ADEQUATE. (3 YRS) AP 96%  1%. ALL AVAILABLE LEAD PARAMETERS WITHIN NORMAL LIMITS. 3 VHR EPISODES DETECTED; 2 SVT, EPISODE #48 NSVT 13 BEATS @ 340ms. PATIENT IS ON ELIQUIS AND SOTALOL; EF 65% (2021, STRESS ECHO). NORMAL DEVICE FUNCTION.---HERNÁNDEZ

## 2024-09-26 DIAGNOSIS — E78.5 DYSLIPIDEMIA: ICD-10-CM

## 2024-09-26 RX ORDER — ATORVASTATIN CALCIUM 40 MG/1
TABLET, FILM COATED ORAL
Qty: 90 TABLET | Refills: 3 | Status: SHIPPED | OUTPATIENT
Start: 2024-09-26

## 2024-11-20 NOTE — PROGRESS NOTES
PATIENT:  Blas De La Garza      1960    ASSESSMENT     1. Primary osteoarthritis of left foot        2. Osteophyte, left foot        3. Postoperative state               PLAN  Patient is doing well post-operatively. Sutures left intact. Incision was cleaned with betadine and DSD applied to be kept C/D/I. Continue post-op care as instructed. Stressed on patient compliance about proper off-loading, staying off of feet, and proper dressing care. Call if any increase in pain, fevers, calf pain, shortness of breath, or general distress is noted. Patient instructed to go to ER if call is not returned immediately. HISTORY OF PRESENT ILLNESS  Patient presents for post-op appointment. Post-op pain is under control and resolving well. The patient is feeling well and in good spirits. Patient reported no post-op concern. She had a reaction from pain med. Then, it was switched to Tramadol. She is not on pain med at this time. REVIEW OF SYSTEMS  GENERAL: No fever or chills. HEART: No chest pain, or palpitation  RESPIRATORY:  No SOB or cough  GI: No Nausea, vomit or diarrhea  NEUROLOGIC: No syncope or acute weakness  MUSCULOSKELETAL: No calf pain or edema. PHYSICAL EXAMINATION  /85   Pulse 55   Ht 5' 4" (1.626 m)   Wt 99.8 kg (220 lb)   BMI 37.76 kg/m²     GENERAL  The patient appears in NAD / non-toxic. Afebrile. VSS    VASCULAR EXAM  Pedal pulses and vascular status are intact. No calf pain or edema bilaterally. No cyanosis. DERMATOLOGIC EXAM  Incision is coapted and healing well. No signs of infection. No active drainage. Normal post-op edema and ecchymosis. No necrosis or dehiscence. NEUROLOGIC EXAM  AAO X 3. No focal neurologic deficit. Neurologic status is intact BLE. MUSCULOSKELETAL EXAM  Normal post-op findings. ROM intact. No fluctuation or crepitus.
show

## 2024-12-05 ENCOUNTER — TELEPHONE (OUTPATIENT)
Dept: CARDIOLOGY CLINIC | Facility: CLINIC | Age: 64
End: 2024-12-05

## 2024-12-10 DIAGNOSIS — I10 HYPERTENSION, UNSPECIFIED TYPE: ICD-10-CM

## 2024-12-10 DIAGNOSIS — I48.0 PAROXYSMAL ATRIAL FIBRILLATION (HCC): ICD-10-CM

## 2024-12-11 DIAGNOSIS — I48.0 PAROXYSMAL ATRIAL FIBRILLATION (HCC): ICD-10-CM

## 2024-12-11 RX ORDER — AMLODIPINE BESYLATE 10 MG/1
10 TABLET ORAL DAILY
Qty: 100 TABLET | Refills: 0 | Status: SHIPPED | OUTPATIENT
Start: 2024-12-11

## 2024-12-11 RX ORDER — APIXABAN 5 MG/1
5 TABLET, FILM COATED ORAL 2 TIMES DAILY
Qty: 180 TABLET | Refills: 0 | Status: SHIPPED | OUTPATIENT
Start: 2024-12-11

## 2024-12-12 DIAGNOSIS — I48.0 PAROXYSMAL ATRIAL FIBRILLATION (HCC): ICD-10-CM

## 2024-12-12 RX ORDER — SOTALOL HYDROCHLORIDE 80 MG/1
80 TABLET ORAL 2 TIMES DAILY
Qty: 180 TABLET | Refills: 2 | Status: SHIPPED | OUTPATIENT
Start: 2024-12-12 | End: 2024-12-13 | Stop reason: SDUPTHER

## 2024-12-13 RX ORDER — SOTALOL HYDROCHLORIDE 80 MG/1
80 TABLET ORAL 2 TIMES DAILY
Qty: 180 TABLET | Refills: 3 | Status: SHIPPED | OUTPATIENT
Start: 2024-12-13

## 2024-12-30 ENCOUNTER — RESULTS FOLLOW-UP (OUTPATIENT)
Dept: CARDIOLOGY CLINIC | Facility: CLINIC | Age: 64
End: 2024-12-30

## 2024-12-30 ENCOUNTER — REMOTE DEVICE CLINIC VISIT (OUTPATIENT)
Dept: CARDIOLOGY CLINIC | Facility: CLINIC | Age: 64
End: 2024-12-30
Payer: MEDICARE

## 2024-12-30 DIAGNOSIS — Z95.0 CARDIAC PACEMAKER IN SITU: Primary | ICD-10-CM

## 2024-12-30 PROCEDURE — 93294 REM INTERROG EVL PM/LDLS PM: CPT | Performed by: INTERNAL MEDICINE

## 2024-12-30 PROCEDURE — 93296 REM INTERROG EVL PM/IDS: CPT | Performed by: INTERNAL MEDICINE

## 2024-12-30 NOTE — PROGRESS NOTES
"Results for orders placed or performed in visit on 12/30/24   Cardiac EP device report    Narrative    MDT DC PM/ACTIVE SYSTEM IS MRI CONDITIONAL  CARELINK TRANSMISSION: BATTERY STATUS \"2 YRS.\" AP 98%  0%. ALL AVAILABLE LEAD PARAMETERS WITHIN NORMAL LIMITS. 4 VHRS NOTED; AVAIL EGRAMS SHOWING BRIEF SVT. PT ON SOTALOL & ELIQUIS. EF 65% (STS/ECHO 2021). NORMAL DEVICE FUNCTION. NC         "

## 2024-12-31 ENCOUNTER — TELEPHONE (OUTPATIENT)
Age: 64
End: 2024-12-31

## 2024-12-31 NOTE — TELEPHONE ENCOUNTER
Caller: Patient    Doctor: ortho    Reason for call:     Patient will be getting her MRI for right knee, will call back to schedule in the new year.    Call back#: n/a
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
## 2025-02-06 ENCOUNTER — OFFICE VISIT (OUTPATIENT)
Dept: OBGYN CLINIC | Facility: HOSPITAL | Age: 65
End: 2025-02-06
Payer: MEDICARE

## 2025-02-06 VITALS — HEIGHT: 64 IN | BODY MASS INDEX: 37.56 KG/M2 | WEIGHT: 220 LBS

## 2025-02-06 DIAGNOSIS — M25.561 CHRONIC PAIN OF RIGHT KNEE: ICD-10-CM

## 2025-02-06 DIAGNOSIS — Z01.810 PRE-OPERATIVE CARDIOVASCULAR EXAMINATION: ICD-10-CM

## 2025-02-06 DIAGNOSIS — Z96.651 AFTERCARE FOLLOWING RIGHT KNEE JOINT REPLACEMENT SURGERY: ICD-10-CM

## 2025-02-06 DIAGNOSIS — G89.29 CHRONIC PAIN OF RIGHT KNEE: ICD-10-CM

## 2025-02-06 DIAGNOSIS — Z79.01 ANTICOAGULATION MANAGEMENT ENCOUNTER: ICD-10-CM

## 2025-02-06 DIAGNOSIS — Z47.1 AFTERCARE FOLLOWING RIGHT KNEE JOINT REPLACEMENT SURGERY: ICD-10-CM

## 2025-02-06 DIAGNOSIS — E66.01 MORBID OBESITY (HCC): ICD-10-CM

## 2025-02-06 DIAGNOSIS — M87.9 OSTEONECROSIS (HCC): ICD-10-CM

## 2025-02-06 DIAGNOSIS — Z01.812 PRE-OPERATIVE LABORATORY EXAMINATION: ICD-10-CM

## 2025-02-06 DIAGNOSIS — Z51.81 ANTICOAGULATION MANAGEMENT ENCOUNTER: ICD-10-CM

## 2025-02-06 DIAGNOSIS — M17.11 PRIMARY OSTEOARTHRITIS OF RIGHT KNEE: Primary | ICD-10-CM

## 2025-02-06 PROCEDURE — 99204 OFFICE O/P NEW MOD 45 MIN: CPT | Performed by: ORTHOPAEDIC SURGERY

## 2025-02-06 PROCEDURE — 20610 DRAIN/INJ JOINT/BURSA W/O US: CPT | Performed by: ORTHOPAEDIC SURGERY

## 2025-02-06 RX ORDER — ATROPINE SULFATE 10 MG/ML
SOLUTION/ DROPS OPHTHALMIC
COMMUNITY
Start: 2025-02-05

## 2025-02-06 RX ORDER — MULTIVIT-MIN/IRON FUM/FOLIC AC 7.5 MG-4
1 TABLET ORAL DAILY
Qty: 30 TABLET | Refills: 2 | Status: SHIPPED | OUTPATIENT
Start: 2025-02-06

## 2025-02-06 RX ORDER — FOLIC ACID 1 MG/1
1 TABLET ORAL DAILY
Qty: 30 TABLET | Refills: 2 | Status: SHIPPED | OUTPATIENT
Start: 2025-02-06

## 2025-02-06 RX ORDER — SODIUM CHLORIDE, SODIUM LACTATE, POTASSIUM CHLORIDE, CALCIUM CHLORIDE 600; 310; 30; 20 MG/100ML; MG/100ML; MG/100ML; MG/100ML
125 INJECTION, SOLUTION INTRAVENOUS CONTINUOUS
OUTPATIENT
Start: 2025-02-06

## 2025-02-06 RX ORDER — FERROUS SULFATE 324(65)MG
324 TABLET, DELAYED RELEASE (ENTERIC COATED) ORAL
Qty: 30 TABLET | Refills: 2 | Status: SHIPPED | OUTPATIENT
Start: 2025-02-06

## 2025-02-06 RX ORDER — TRANEXAMIC ACID 10 MG/ML
1000 INJECTION, SOLUTION INTRAVENOUS ONCE
OUTPATIENT
Start: 2025-02-06 | End: 2025-02-06

## 2025-02-06 RX ORDER — QUETIAPINE FUMARATE 100 MG/1
100 TABLET, FILM COATED ORAL
COMMUNITY
Start: 2025-02-04 | End: 2026-02-04

## 2025-02-06 RX ORDER — OMEPRAZOLE 40 MG/1
1 CAPSULE, DELAYED RELEASE ORAL DAILY
COMMUNITY
Start: 2024-12-02

## 2025-02-06 RX ORDER — GABAPENTIN 300 MG/1
300 CAPSULE ORAL ONCE
OUTPATIENT
Start: 2025-02-06 | End: 2025-02-06

## 2025-02-06 RX ORDER — LOSARTAN POTASSIUM 25 MG/1
TABLET ORAL EVERY 24 HOURS
COMMUNITY

## 2025-02-06 RX ORDER — ASCORBIC ACID 500 MG
500 TABLET ORAL 2 TIMES DAILY
Qty: 60 TABLET | Refills: 2 | Status: SHIPPED | OUTPATIENT
Start: 2025-02-06

## 2025-02-06 RX ORDER — ACETAMINOPHEN 325 MG/1
975 TABLET ORAL ONCE
OUTPATIENT
Start: 2025-02-06 | End: 2025-02-06

## 2025-02-06 RX ORDER — NEOMYCIN SULFATE, POLYMYXIN B SULFATE, AND DEXAMETHASONE 3.5; 10000; 1 MG/G; [USP'U]/G; MG/G
OINTMENT OPHTHALMIC
COMMUNITY
Start: 2025-01-16

## 2025-02-06 RX ORDER — CHLORHEXIDINE GLUCONATE ORAL RINSE 1.2 MG/ML
15 SOLUTION DENTAL ONCE
OUTPATIENT
Start: 2025-02-06 | End: 2025-02-06

## 2025-02-06 RX ORDER — FLUTICASONE PROPIONATE 50 MCG
2 SPRAY, SUSPENSION (ML) NASAL DAILY
COMMUNITY
Start: 2025-02-04

## 2025-02-06 RX ORDER — KETOROLAC TROMETHAMINE 30 MG/ML
60 INJECTION, SOLUTION INTRAMUSCULAR; INTRAVENOUS
Status: COMPLETED | OUTPATIENT
Start: 2025-02-06 | End: 2025-02-06

## 2025-02-06 RX ORDER — PREDNISOLONE ACETATE 10 MG/ML
SUSPENSION/ DROPS OPHTHALMIC
COMMUNITY
Start: 2025-01-14

## 2025-02-06 RX ORDER — METHYLPREDNISOLONE 4 MG/1
TABLET ORAL
COMMUNITY
Start: 2024-12-07

## 2025-02-06 RX ORDER — TRAMADOL HYDROCHLORIDE 100 MG/1
TABLET, EXTENDED RELEASE ORAL EVERY 24 HOURS
COMMUNITY

## 2025-02-06 RX ORDER — LIDOCAINE HYDROCHLORIDE 10 MG/ML
4 INJECTION, SOLUTION INFILTRATION; PERINEURAL
Status: COMPLETED | OUTPATIENT
Start: 2025-02-06 | End: 2025-02-06

## 2025-02-06 RX ORDER — NETARSUDIL AND LATANOPROST OPHTHALMIC SOLUTION, 0.02%/0.005% .2; .05 MG/ML; MG/ML
1 SOLUTION/ DROPS OPHTHALMIC; TOPICAL
COMMUNITY

## 2025-02-06 RX ORDER — CEFAZOLIN SODIUM 2 G/50ML
2000 SOLUTION INTRAVENOUS ONCE
OUTPATIENT
Start: 2025-02-06 | End: 2025-02-06

## 2025-02-06 RX ORDER — MOXIFLOXACIN 5 MG/ML
SOLUTION/ DROPS OPHTHALMIC
COMMUNITY
Start: 2025-01-31

## 2025-02-06 RX ORDER — MUPIROCIN 20 MG/G
OINTMENT TOPICAL
Qty: 15 G | Refills: 1 | Status: SHIPPED | OUTPATIENT
Start: 2025-02-06

## 2025-02-06 RX ADMIN — KETOROLAC TROMETHAMINE 60 MG: 30 INJECTION, SOLUTION INTRAMUSCULAR; INTRAVENOUS at 13:15

## 2025-02-06 RX ADMIN — LIDOCAINE HYDROCHLORIDE 4 ML: 10 INJECTION, SOLUTION INFILTRATION; PERINEURAL at 13:15

## 2025-02-06 NOTE — PROGRESS NOTES
Assessment:  Assessment & Plan  Primary osteoarthritis of right knee    Orders:    Large joint arthrocentesis: R knee    Case request operating room: Robotically assisted right total knee arthroplasty, all associated procedures as indicated; Standing    Notify physician; Standing    Diet NPO; Sips with meds; Standing    Nursing Communication Warmimg Interventions Implemented; Standing    Nursing Communication CHG bath, have staff wash entire body (neck down) per pre-op bathing protocol. Routine, evening prior to, and day of surgery.; Standing    Nursing Communication Swab both nares with Povidone-Iodine solution, EXCLUDE if patient has shellfish/Iodine allergy, and replace with nasal alcohol swabstick. Routine, day of surgery, on call to OR; Standing    Void on call to OR; Standing    Insert peripheral IV; Standing    [facility]; Future    Ambulatory referral to Physical Therapy; Future    Place sequential compression device; Standing    Ambulatory referral to Family Practice; Future    Chronic pain of right knee    Orders:    Large joint arthrocentesis: R knee    Case request operating room: Robotically assisted right total knee arthroplasty, all associated procedures as indicated; Standing    Notify physician; Standing    Diet NPO; Sips with meds; Standing    Nursing Communication Warmimg Interventions Implemented; Standing    Nursing Communication CHG bath, have staff wash entire body (neck down) per pre-op bathing protocol. Routine, evening prior to, and day of surgery.; Standing    Nursing Communication Swab both nares with Povidone-Iodine solution, EXCLUDE if patient has shellfish/Iodine allergy, and replace with nasal alcohol swabstick. Routine, day of surgery, on call to OR; Standing    Void on call to OR; Standing    Insert peripheral IV; Standing    [facility]; Future    Ambulatory referral to Physical Therapy;  Future    Place sequential compression device; Standing    Ambulatory referral to Family Practice; Future    Pre-operative laboratory examination    Orders:    Comprehensive metabolic panel; Future    Hemoglobin A1C W/EAG Estimation; Future    CBC and differential; Future    MRSA culture; Future    Protime-INR; Future    APTT; Future    Type and screen; Future    Anemia Panel w/Reflex; Future    Pre-operative cardiovascular examination    Orders:    EKG 12 lead; Future    Aftercare following right knee joint replacement surgery    Orders:    Ambulatory referral to Physical Therapy; Future    Anticoagulation management encounter    Orders:    CBC and differential; Future    Protime-INR; Future    APTT; Future      Plan:  Diagnostics reviewed and physical exam performed.  Diagnosis, treatment options and associated risks were discussed with the patient including no treatment, nonsurgical treatment and potential for surgical intervention.  The patient was given the opportunity to ask questions regarding each.  Despite having a meniscal tear on her MRI, it also shows that she has loss of articular cartilage medial and patellofemoral compartments. Quality of life decision to pursue elective right TKA. Advised against an arthroscopy.  Risks and benefits of a robotically assisted right total knee arthroplasty were discussed.  Consents obtained.  Preoperative vitamins and mupirocin sent to her pharmacy of record  Counseled on weight loss and general wellness measures.   She was offered, accepted, performed injection of lidocaine and Toradol to her right knee today for symptomatic relief.  She tolerated the procedure well.  Ice postinjection protocol advised.  Weightbearing and activity as tolerated.    To do next visit:  Return for post-op with x-rays upon arrival right knee.    The above stated was discussed in layman's terms and the patient expressed understanding.  All questions were answered to the patient's  satisfaction.       Scribe Attestation      I,:  Raimundo Limon am acting as a scribe while in the presence of the attending physician.:       I,:  Sagar Valentin MD personally performed the services described in this documentation    as scribed in my presence.:               Subjective:   Ana Abrams is a 64 y.o. adult who presents today for initial evaluation of her bilateral knees. She is experiencing pain at her right knee that increases with weight bearing activities as well as at night trying to get comfortable.  Pain scale 8-9/10, currently 5/10  Takes eliquis, cannot take PO NSAIDs.  She has had a multitude of nonoperative treatments over the past several years to her right knee to include cortisone and viscosupplementation injections.  She has history of 2 right knee scopes previously.  She is increase stiffness and pain getting up at the prolonged sedentary positions.  In regards to her left knee, she notes significant crepitation but in the absence of any pain.    Hx of left TKA by Dr. 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   Hx of two right knee arthroscopies, 02/2020 and 02/2016    Review of systems negative unless otherwise specified in HPI  Review of Systems    Past Medical History:   Diagnosis Date    Arthritis     Asthma     Atrial fibrillation (HCC)     Hyperlipidemia     Hypertension     Nondisplaced fracture of distal phalanx of left great toe     Osteoarthritis     Other specific joint derangements of right foot, not elsewhere classified        Past Surgical History:   Procedure Laterality Date    BONE EXOSTOSIS EXCISION Left 8/18/2023    Procedure: EXCISION EXOSTOSIS LEFT MIDTARSAL JOINT (cpt 47048);  Surgeon: Villa Lord DPM;  Location:  MAIN OR;  Service: Podiatry    BREAST LUMPECTOMY      CHOLECYSTECTOMY  1982    COLONOSCOPY      FOOT SURGERY  2009    HYSTERECTOMY  2000    INSERT / REPLACE / REMOVE PACEMAKER      MOUTH SURGERY          Family History   Problem Relation Age of Onset    Diabetes Family     Cancer Family     Hypertension Family     Arthritis Family        Social History     Occupational History    Not on file   Tobacco Use    Smoking status: Former     Current packs/day: 0.00     Types: Cigarettes     Quit date: 1994     Years since quittin.7    Smokeless tobacco: Never   Vaping Use    Vaping status: Never Used   Substance and Sexual Activity    Alcohol use: No    Drug use: No    Sexual activity: Not Currently     Partners: Male     Birth control/protection: Surgical         Current Outpatient Medications:     atropine (ISOPTO ATROPINE) 1 % ophthalmic solution, , Disp: , Rfl:     fluticasone (FLONASE) 50 mcg/act nasal spray, 2 sprays into each nostril daily, Disp: , Rfl:     methylPREDNISolone 4 MG tablet therapy pack, TAKE MEDROL DOSE PACK AS DIRECTED ON PACKAGING FOR 6 DAYS., Disp: , Rfl:     moxifloxacin (VIGAMOX) 0.5 % ophthalmic solution, instill ONE drop IN THE LEFT EYE FOUR TIMES DAILY STARTING 2 days prior TO surgery, Disp: , Rfl:     neomycin-polymyxin-dexamethasone (MAXITROL) 0.35%-10,000 units/g-0.1%, APPLY A THIN LAYER TO LEFT EYE AT BEDTIME AFTER SURGERY, Disp: , Rfl:     omeprazole (PriLOSEC) 40 MG capsule, Take 1 capsule by mouth in the morning, Disp: , Rfl:     prednisoLONE acetate (PRED FORTE) 1 % ophthalmic suspension, instill ONE drop IN THE LEFT EYE every ONE TO 2 hours after surgery, Disp: , Rfl:     QUEtiapine (SEROquel) 100 mg tablet, Take 100 mg by mouth, Disp: , Rfl:     ALPRAZolam (XANAX) 0.5 mg tablet, Take by mouth daily at bedtime as needed for anxiety, Disp: , Rfl:     amLODIPine (NORVASC) 10 mg tablet, TAKE 1 TABLET(10 MG) BY MOUTH DAILY, Disp: 100 tablet, Rfl: 0    apixaban (Eliquis) 5 mg, TAKE 1 TABLET BY MOUTH TWICE DAILY, Disp: 180 tablet, Rfl: 0    atorvastatin (LIPITOR) 40 mg tablet, TAKE 1 TABLET BY MOUTH AT BEDTIME, Disp: 90 tablet, Rfl: 3    dorzolamide-timolol (COSOPT) 2-0.5 %  ophthalmic solution, Administer 1 drop to both eyes 2 (two) times a day, Disp: , Rfl:     lisinopril-hydrochlorothiazide (PRINZIDE,ZESTORETIC) 20-25 MG per tablet, Take 2 tablets by mouth daily, Disp: 180 tablet, Rfl: 3    losartan (COZAAR) 25 mg tablet, every 24 hours, Disp: , Rfl:     pantoprazole (PROTONIX) 40 mg tablet, TAKE 1 TABLET BY MOUTH BEFORE BREAKFAST, Disp: , Rfl:     Rocklatan 0.02-0.005 % SOLN, Apply 1 drop to eye, Disp: , Rfl:     sertraline (ZOLOFT) 50 mg tablet, TAKE 2 AND 1/2 TABLETS BY MOUTH DAILY WITH A 25 MG TABLET, Disp: , Rfl:     sotalol (BETAPACE) 80 mg tablet, TAKE 1 TABLET(80 MG) BY MOUTH TWICE DAILY, Disp: 180 tablet, Rfl: 3    traMADol (ULTRAM-ER) 100 mg 24 hr tablet, every 24 hours, Disp: , Rfl:     VENTOLIN  (90 Base) MCG/ACT inhaler, 2 puffs every 4 (four) hours as needed, Disp: , Rfl:     Current Facility-Administered Medications:     lidocaine (XYLOCAINE) 1 % injection 2 mL, 2 mL, Injection, , Villa Lord, DPM, 2 mL at 05/09/23 1223    lidocaine (XYLOCAINE) 1 % injection 2 mL, 2 mL, Injection, , Villa Lord, DPM, 2 mL at 07/11/23 1630    triamcinolone acetonide (KENALOG-40) 40 mg/mL injection 20 mg, 20 mg, Intra-articular, , Villa Lord, DPM, 20 mg at 05/09/23 1223    triamcinolone acetonide (KENALOG-40) 40 mg/mL injection 40 mg, 40 mg, Intra-articular, , Villa Lord, DPM, 40 mg at 07/11/23 1630    Allergies   Allergen Reactions    Lomotil [Diphenoxylate]     Oxycodone Headache          There were no vitals filed for this visit.    Body mass index is 37.76 kg/m².  Wt Readings from Last 3 Encounters:   02/06/25 99.8 kg (220 lb)   02/27/24 105 kg (231 lb)   11/29/23 99.8 kg (220 lb)       Objective:                    Right Knee Exam     Tenderness   The patient is experiencing tenderness in the lateral joint line and medial joint line.    Range of Motion   Extension:  0   Flexion:  120 (with significant crepitation)     Other   Erythema: absent  Sensation: normal  Swelling:  "mild  Effusion: effusion (trace) present    Comments:    Intact extensor mechanism  Valgus alignment   Stable to varus-valgus stressing            Diagnostics, reviewed and taken today if performed as documented:    None performed but reviewed:     The attending physician has personally reviewed the pertinent films in PACS and interpretation is as follows:    Right and left knee x-rays taken 11/22/24 were reviewed today and show: her right knee shows advanced patellofemoral compartment degenerative changes with mild to modest medial and lateral compartment narrowing. Subchondral sclerosis and osteophyte formation present  left knee shows a rather well appearing total knee prosthesis without gross signs of loosening or stress shielding    Right knee MRI from 12/19/24 was reviewed today and show: medial meniscus tear with full thickness chondral loss medially and patellofemoral compartments, effusion.      Procedures, if performed today:    Large joint arthrocentesis: R knee  Universal Protocol:  Consent: Verbal consent obtained.  Risks and benefits: risks, benefits and alternatives were discussed  Consent given by: patient  Time out: Immediately prior to procedure a \"time out\" was called to verify the correct patient, procedure, equipment, support staff and site/side marked as required.  Timeout called at: 2/6/2025 1:17 PM.  Patient understanding: patient states understanding of the procedure being performed  Site marked: the operative site was marked  Patient identity confirmed: verbally with patient  Supporting Documentation  Indications: pain and diagnostic evaluation   Procedure Details  Location: knee - R knee  Preparation: Patient was prepped and draped in the usual sterile fashion  Needle size: 22 G  Ultrasound guidance: no  Approach: anteromedial  Medications administered: 60 mg ketorolac 60 mg/2 mL; 4 mL lidocaine 1 %    Patient tolerance: patient tolerated the procedure well with no immediate " "complications  Dressing:  Sterile dressing applied            Portions of the record may have been created with voice recognition software.  Occasional wrong word or \"sound a like\" substitutions may have occurred due to the inherent limitations of voice recognition software.  Read the chart carefully and recognize, using context, where substitutions have occurred.  "

## 2025-02-06 NOTE — PATIENT INSTRUCTIONS
"1. Primary osteoarthritis of right knee  Large joint arthrocentesis: R knee      2. Chronic pain of right knee  Large joint arthrocentesis: R knee      3. Pre-operative laboratory examination        4. Pre-operative cardiovascular examination        5. Aftercare following right knee joint replacement surgery            Return for post-op with x-rays upon arrival right knee.    Patient Education     Deciding to have a knee replacement   The Basics   Written by the doctors and editors at [facility]   What is knee replacement? -- Knee replacement is surgery to replace part or all of a person's knee joint with artificial or \"prosthetic\" parts.  The knee joint is made up of the:   Lower part of the thigh bone (femur)   Kneecap (patella)   Top part of the shinbone (tibia)  When a person has problems with their knee joint, it can cause pain, swelling, or stiffness. It can also have trouble moving normally. Different conditions can cause problems with the knee joint. One of the most common causes is osteoarthritis, a type of arthritis that often comes with age (figure 1).  Knee replacement is a treatment that can reduce knee pain and improve the way the knee works.  When is knee replacement recommended? -- Knee problems might or might not be treated with knee replacement right away. Doctors often suggest trying other treatments first. These can include:   Weight loss   Medicines   Knee braces   Physical therapy  If these treatments do not help enough, then doctors might suggest knee replacement.  One reason doctors suggest trying other treatments first, especially for younger people, is that a replacement knee joint can wear out over time. Knee replacements usually last at least 15 years, and most last longer. How long a replacement joint lasts depends on different things, such as how active the person is.  What happens during knee replacement surgery? -- Knee replacement happens in an operating room in a hospital. You will " "get \"anesthesia\" medicines to make you sleep and to numb your body. Then, the surgeon will make a cut down the center on the front of the knee. They will replace parts of your knee joint with \"prosthetic\" parts. These can be made out of metal, ceramic, or plastic (figure 2).  After surgery, most people stay in the hospital for 2 to 4 days. In some cases, the stay might be shorter. While you are in the hospital, you will get:   Medicines to treat your pain   Antibiotic medicines to prevent infections   Medicines to prevent blood clots in the legs   Special boots or stockings to prevent blood clots in the legs   Physical therapy - Most people are able to stand and walk (with help) within a day after surgery. The physical therapist will teach you exercises to make the muscles in your leg stronger. They will also work with you on bending, walking, and climbing stairs so you can move normally.  What problems can happen after knee replacement? -- People can have different problems right after knee replacement surgery, but serious problems are uncommon.  Problems from knee replacement can include:   Blood clot in the legs - This can cause leg pain and swelling.   Infection - Symptoms of an infection can include fever, chills, pain in the knee that gets worse, or knee swelling.  If you have any of these symptoms, tell your doctor or nurse.  People who have a knee replaced sometimes find that the new knee is stiff. It might not bend as well as their own knee used to. This can make it hard to climb stairs or get up from a low chair.  Where do I go after I leave the hospital? -- Many people go home. But some people stay in a nursing home or rehabilitation center for a short time to get stronger before going home. Wherever you go, it's important to do your exercises and have physical therapy.  When will I be able to do my usual activities again? -- Most people can do their usual activities again within 4 to 6 weeks of knee " "replacement. Your doctor or nurse will tell you if you should avoid any activities.  How do I know if knee replacement is right for me? -- To help you decide if knee replacement is right for you, talk with your doctor or nurse. Ask them:   What are the benefits of knee replacement?   What are the downsides of knee replacement?   Are there other options besides knee replacement?   What happens if I do not have knee replacement?   How could a knee replacement affect my other health conditions?  All topics are updated as new evidence becomes available and our peer review process is complete.  This topic retrieved from [employer] on: May 16, 2024.  Topic 23355 Version 19.0  Release: 32.4.3 - C32.135  © 2024 UpToDate, Inc. and/or its affiliates. All rights reserved.  figure 1: Knee osteoarthritis     This drawing shows a normal knee joint next to a knee joint with osteoarthritis. In the osteoarthritis joint, the cartilage covering the ends of the bones roughens and becomes thin, while the bone underneath the cartilage grows thicker. Bony growths called \"osteophytes\" can form. The space between the bones also becomes narrower.  Graphic 226044 Version 3.0  figure 2: Total knee replacement     For a total kneereplacement, the doctor does surgery to replace the knee joint with artificial or \"prosthetic\" parts.  Graphic 307570 Version 1.0  Consumer Information Use and Disclaimer   Disclaimer: This generalized information is a limited summary of diagnosis, treatment, and/or medication information. It is not meant to be comprehensive and should be used as a tool to help the user understand and/or assess potential diagnostic and treatment options. It does NOT include all information about conditions, treatments, medications, side effects, or risks that may apply to a specific patient. It is not intended to be medical advice or a substitute for the medical advice, diagnosis, or treatment of a health care provider based on the " health care provider's examination and assessment of a patient's specific and unique circumstances. Patients must speak with a health care provider for complete information about their health, medical questions, and treatment options, including any risks or benefits regarding use of medications. This information does not endorse any treatments or medications as safe, effective, or approved for treating a specific patient. UpToDate, Inc. and its affiliates disclaim any warranty or liability relating to this information or the use thereof.The use of this information is governed by the Terms of Use, available at https://www.woltersShieldEffectuwer.com/en/know/clinical-effectiveness-terms. 2024© UpToDate, Inc. and its affiliates and/or licensors. All rights reserved.  Copyright   © 2024 UpToDate, Inc. and/or its affiliates. All rights reserved.

## 2025-03-05 DIAGNOSIS — I48.0 PAROXYSMAL ATRIAL FIBRILLATION (HCC): ICD-10-CM

## 2025-03-05 DIAGNOSIS — I10 HYPERTENSION, UNSPECIFIED TYPE: ICD-10-CM

## 2025-03-06 ENCOUNTER — OFFICE VISIT (OUTPATIENT)
Dept: CARDIOLOGY CLINIC | Facility: CLINIC | Age: 65
End: 2025-03-06
Payer: MEDICARE

## 2025-03-06 VITALS
DIASTOLIC BLOOD PRESSURE: 82 MMHG | HEART RATE: 64 BPM | HEIGHT: 64 IN | WEIGHT: 219 LBS | BODY MASS INDEX: 37.39 KG/M2 | OXYGEN SATURATION: 97 % | SYSTOLIC BLOOD PRESSURE: 128 MMHG

## 2025-03-06 DIAGNOSIS — Z95.0 CARDIAC PACEMAKER IN SITU: ICD-10-CM

## 2025-03-06 DIAGNOSIS — E78.5 DYSLIPIDEMIA: ICD-10-CM

## 2025-03-06 DIAGNOSIS — Z95.0 HISTORY OF PERMANENT CARDIAC PACEMAKER PLACEMENT: ICD-10-CM

## 2025-03-06 DIAGNOSIS — I10 HYPERTENSION, UNSPECIFIED TYPE: ICD-10-CM

## 2025-03-06 DIAGNOSIS — I48.0 PAROXYSMAL ATRIAL FIBRILLATION (HCC): Primary | ICD-10-CM

## 2025-03-06 LAB
ANION GAP SERPL CALCULATED.3IONS-SCNC: 10 MMOL/L (ref 3–11)
BUN SERPL-MCNC: 15 MG/DL (ref 7–25)
CALCIUM SERPL-MCNC: 9.5 MG/DL (ref 8.5–10.5)
CHLORIDE SERPL-SCNC: 105 MMOL/L (ref 100–109)
CHOLEST SERPL-MCNC: 192 MG/DL
CHOLEST/HDLC SERPL: 2.7 {RATIO}
CO2 SERPL-SCNC: 28 MMOL/L (ref 21–31)
CREAT SERPL-MCNC: 0.75 MG/DL (ref 0.4–1.1)
CYTOLOGY CMNT CVX/VAG CYTO-IMP: ABNORMAL
ERYTHROCYTE [DISTWIDTH] IN BLOOD BY AUTOMATED COUNT: 15.5 % (ref 12–16)
GFR/BSA.PRED SERPLBLD CYS-BASED-ARV: 88 ML/MIN/{1.73_M2}
GLUCOSE SERPL-MCNC: 114 MG/DL (ref 65–99)
HCT VFR BLD AUTO: 33.9 % (ref 35–43)
HDLC SERPL-MCNC: 72 MG/DL (ref 23–92)
HGB BLD-MCNC: 11.4 G/DL (ref 11.5–14.5)
LDLC SERPL CALC-MCNC: 99 MG/DL
MCH RBC QN AUTO: 26.7 PG (ref 26–34)
MCHC RBC AUTO-ENTMCNC: 33.6 G/DL (ref 32–37)
MCV RBC AUTO: 80 FL (ref 80–100)
NONHDLC SERPL-MCNC: 120 MG/DL
PLATELET # BLD AUTO: 177 THOU/CMM (ref 140–350)
PMV BLD REES-ECKER: 8.7 FL (ref 7.5–11.3)
POTASSIUM SERPL-SCNC: 3.8 MMOL/L (ref 3.5–5.2)
RBC # BLD AUTO: 4.27 MILL/CMM (ref 3.7–4.7)
SODIUM SERPL-SCNC: 143 MMOL/L (ref 135–145)
TRIGL SERPL-MCNC: 103 MG/DL
WBC # BLD AUTO: 5.3 THOU/CMM (ref 4–10)

## 2025-03-06 PROCEDURE — 99214 OFFICE O/P EST MOD 30 MIN: CPT | Performed by: INTERNAL MEDICINE

## 2025-03-06 PROCEDURE — 93000 ELECTROCARDIOGRAM COMPLETE: CPT | Performed by: INTERNAL MEDICINE

## 2025-03-06 RX ORDER — AMLODIPINE BESYLATE 10 MG/1
10 TABLET ORAL DAILY
Qty: 90 TABLET | Refills: 3 | Status: SHIPPED | OUTPATIENT
Start: 2025-03-06

## 2025-03-06 RX ORDER — APIXABAN 5 MG/1
5 TABLET, FILM COATED ORAL 2 TIMES DAILY
Qty: 180 TABLET | Refills: 0 | Status: SHIPPED | OUTPATIENT
Start: 2025-03-06

## 2025-03-06 RX ORDER — LISINOPRIL AND HYDROCHLOROTHIAZIDE 20; 25 MG/1; MG/1
2 TABLET ORAL DAILY
Qty: 180 TABLET | Refills: 3 | Status: SHIPPED | OUTPATIENT
Start: 2025-03-06
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
## 2025-03-11 ENCOUNTER — RESULTS FOLLOW-UP (OUTPATIENT)
Dept: CARDIOLOGY CLINIC | Facility: CLINIC | Age: 65
End: 2025-03-11

## 2025-04-01 ENCOUNTER — REMOTE DEVICE CLINIC VISIT (OUTPATIENT)
Dept: CARDIOLOGY CLINIC | Facility: CLINIC | Age: 65
End: 2025-04-01
Payer: MEDICARE

## 2025-04-01 ENCOUNTER — TELEPHONE (OUTPATIENT)
Dept: CARDIOLOGY CLINIC | Facility: CLINIC | Age: 65
End: 2025-04-01

## 2025-04-01 DIAGNOSIS — Z95.0 CARDIAC PACEMAKER IN SITU: Primary | ICD-10-CM

## 2025-04-01 PROCEDURE — 93294 REM INTERROG EVL PM/LDLS PM: CPT | Performed by: INTERNAL MEDICINE

## 2025-04-01 PROCEDURE — 93296 REM INTERROG EVL PM/IDS: CPT | Performed by: INTERNAL MEDICINE

## 2025-04-01 NOTE — PROGRESS NOTES
Results for orders placed or performed in visit on 04/01/25   Cardiac EP device report    Narrative    MDT DC PM/ACTIVE SYSTEM IS MRI CONDITIONAL  CARELINK TRANSMISSION: BATTERY VOLTAGE ADEQUATE (2.5 YRS). AP-99%, <0.1%. ALL AVAILABLE LEAD PARAMETERS WITHIN NORMAL LIMITS. 2 DEVICE CLASSIFIED NSVT EPISODES- PAT ON EGM'S. PT ON ELIQUIS & SOTALOL. NORMAL DEVICE FUNCTION. GV

## 2025-04-01 NOTE — TELEPHONE ENCOUNTER
4/1/25 TALKED WITH ANNEMARIE. SHE FORGOT TO SEND IN TRANSMISSION ON PACEMAKER. SHE WILL SEND TODAY. MIK

## 2025-04-02 ENCOUNTER — RESULTS FOLLOW-UP (OUTPATIENT)
Dept: NON INVASIVE DIAGNOSTICS | Facility: HOSPITAL | Age: 65
End: 2025-04-02

## 2025-06-30 ENCOUNTER — REMOTE DEVICE CLINIC VISIT (OUTPATIENT)
Dept: CARDIOLOGY CLINIC | Facility: CLINIC | Age: 65
End: 2025-06-30
Payer: MEDICARE

## 2025-06-30 DIAGNOSIS — I48.91 ATRIAL FIBRILLATION, UNSPECIFIED TYPE (HCC): ICD-10-CM

## 2025-06-30 DIAGNOSIS — I49.5 SSS (SICK SINUS SYNDROME) (HCC): Primary | ICD-10-CM

## 2025-06-30 PROCEDURE — 93294 REM INTERROG EVL PM/LDLS PM: CPT | Performed by: INTERNAL MEDICINE

## 2025-06-30 PROCEDURE — 93296 REM INTERROG EVL PM/IDS: CPT | Performed by: INTERNAL MEDICINE

## 2025-06-30 NOTE — PROGRESS NOTES
Results for orders placed or performed in visit on 06/30/25   Cardiac EP device report    Narrative    MDT DC PM/ACTIVE SYSTEM IS MRI CONDITIONAL  CARELINK TRANSMISSION: BATTERY VOLTAGE ADEQUATE (2.5 YRS). AP: 97.8%. : <0.1% (MVP-ON). ALL AVAILABLE LEAD PARAMETERS WITHIN NORMAL LIMITS. 1 VT EPISODE W/ EGM SHOWING PAT 7 BEATS @ 162 BPM. 1 FAST A&V EPISODE W/ EGM SHOWING SVT-ST @ 167 BPM, DURATION 5 SECS. PT TAKES ELIQUIS, SOTALOL. EF: 65% (ST TX 7/20/21) NORMAL DEVICE FUNCTION. CH

## 2025-07-10 ENCOUNTER — PATIENT MESSAGE (OUTPATIENT)
Dept: CARDIOLOGY CLINIC | Facility: CLINIC | Age: 65
End: 2025-07-10

## 2025-07-11 DIAGNOSIS — I48.0 PAROXYSMAL ATRIAL FIBRILLATION (HCC): Primary | ICD-10-CM

## 2025-07-11 RX ORDER — DABIGATRAN ETEXILATE 150 MG/1
150 CAPSULE ORAL 2 TIMES DAILY
Qty: 180 CAPSULE | Refills: 3 | Status: SHIPPED | OUTPATIENT
Start: 2025-07-11

## 2025-07-21 NOTE — PATIENT COMMUNICATION
Spoke with the patient; she has already picked up the Dabigatran 150 mg tablets, 90 day supply.  Called and spoke to Noar; the Eliquis was from another prescriber. Advised the pharmacist that the patient has already picked up the Dabigatran 150 mg tablets and advised to not fill the Eliquis at this time.     [facility] # [phone]  ref # [id number]

## 2025-07-21 NOTE — PATIENT COMMUNICATION
Received call form [facility] asking if Eliquis has been d/c'd? I advised it was stopped due to cost per Ana's message. They stated the co pay for Eliquis is zero dollars for 90 days and the Pradaxa is 176.00.   [facility] # [phone]- they are waiting for a call back before filling  ref # [id number]                                               Left message on machine for Ana to call the office

## (undated) DEVICE — GLOVE PI ULTRA TOUCH SZ.7.5

## (undated) DEVICE — STOCKINETTE 2P PREROLLD 6X60

## (undated) DEVICE — SUT ETHILON 4-0 PS-2 18 IN 1667H

## (undated) DEVICE — KERLIX BANDAGE ROLL: Brand: KERLIX

## (undated) DEVICE — SUT VICRYL 3-0 PS-2 18 IN J497G

## (undated) DEVICE — CAST PADDING 4 IN SYNTHETIC NON-STRL

## (undated) DEVICE — SYRINGE 10ML LL

## (undated) DEVICE — STERILE POLYISOPRENE POWDER-FREE SURGICAL GLOVES WITH EMOLLIENT COATING: Brand: PROTEXIS

## (undated) DEVICE — GAUZE SPONGES,16 PLY: Brand: CURITY

## (undated) DEVICE — PENCIL ELECTROSURG E-Z CLEAN -0035H

## (undated) DEVICE — INTENDED FOR TISSUE SEPARATION, AND OTHER PROCEDURES THAT REQUIRE A SHARP SURGICAL BLADE TO PUNCTURE OR CUT.: Brand: BARD-PARKER ® SAFETYLOCK CARBON RIB-BACK BLADES

## (undated) DEVICE — SUT VICRYL 4-0 PS-2 27 IN J426H

## (undated) DEVICE — THIN OFFSET (9.0 X 0.38 X 25.0MM)

## (undated) DEVICE — BETHLEHEM UNIVERSAL  MIONR EXT: Brand: CARDINAL HEALTH

## (undated) DEVICE — STRETCH BANDAGE: Brand: CURITY

## (undated) DEVICE — CUFF TOURNIQUET 18 X 4 IN QUICK CONNECT DISP 1 BLADDER

## (undated) DEVICE — ACE WRAP 4 IN UNSTERILE

## (undated) DEVICE — SINGLE PORT MANIFOLD: Brand: NEPTUNE 2

## (undated) DEVICE — POV-IOD SOLUTION 4OZ BT

## (undated) DEVICE — DISPOSABLE OR TOWEL: Brand: CARDINAL HEALTH

## (undated) DEVICE — NEEDLE 25G X 1 1/2

## (undated) DEVICE — CHLORAPREP HI-LITE 26ML ORANGE

## (undated) DEVICE — SCD SEQUENTIAL COMPRESSION COMFORT SLEEVE MEDIUM KNEE LENGTH: Brand: KENDALL SCD

## (undated) DEVICE — SMALL TEAR CROSS CUT RASP (11.0 X 5.0MM)

## (undated) DEVICE — CURITY NON-ADHERENT STRIPS: Brand: CURITY

## (undated) DEVICE — NEEDLE BLUNT 18 G X 1 1/2IN